# Patient Record
Sex: FEMALE | HISPANIC OR LATINO | URBAN - METROPOLITAN AREA
[De-identification: names, ages, dates, MRNs, and addresses within clinical notes are randomized per-mention and may not be internally consistent; named-entity substitution may affect disease eponyms.]

---

## 2017-01-11 ENCOUNTER — DOCTOR'S OFFICE (OUTPATIENT)
Dept: URBAN - METROPOLITAN AREA CLINIC 125 | Facility: CLINIC | Age: 72
Setting detail: OPHTHALMOLOGY
End: 2017-01-11
Payer: COMMERCIAL

## 2017-01-11 DIAGNOSIS — H25.043: ICD-10-CM

## 2017-01-11 PROCEDURE — 76519 ECHO EXAM OF EYE: CPT | Performed by: OPHTHALMOLOGY

## 2017-01-23 ENCOUNTER — AMBUL SURGICAL CARE (OUTPATIENT)
Dept: URBAN - METROPOLITAN AREA SURGERY 29 | Facility: SURGERY | Age: 72
Setting detail: OPHTHALMOLOGY
End: 2017-01-23
Payer: COMMERCIAL

## 2017-01-23 DIAGNOSIS — H25.011: ICD-10-CM

## 2017-01-23 DIAGNOSIS — H25.11: ICD-10-CM

## 2017-01-23 DIAGNOSIS — H25.041: ICD-10-CM

## 2017-01-23 PROCEDURE — 66984 XCAPSL CTRC RMVL W/O ECP: CPT | Performed by: OPHTHALMOLOGY

## 2017-01-23 PROCEDURE — G8918 PT W/O PREOP ORDER IV AB PRO: HCPCS | Performed by: OPHTHALMOLOGY

## 2017-01-23 PROCEDURE — G8907 PT DOC NO EVENTS ON DISCHARG: HCPCS | Performed by: OPHTHALMOLOGY

## 2017-01-24 ENCOUNTER — DOCTOR'S OFFICE (OUTPATIENT)
Dept: URBAN - METROPOLITAN AREA CLINIC 125 | Facility: CLINIC | Age: 72
Setting detail: OPHTHALMOLOGY
End: 2017-01-24
Payer: COMMERCIAL

## 2017-01-24 ENCOUNTER — RX ONLY (RX ONLY)
Age: 72
End: 2017-01-24

## 2017-01-24 DIAGNOSIS — Z96.1: ICD-10-CM

## 2017-01-24 PROCEDURE — 99024 POSTOP FOLLOW-UP VISIT: CPT | Performed by: OPTOMETRIST

## 2017-01-24 ASSESSMENT — REFRACTION_AUTOREFRACTION
OS_AXIS: 089
OD_SPHERE: +0.50
OS_CYLINDER: -2.00
OS_SPHERE: +1.00
OD_AXIS: 080
OD_CYLINDER: -1.00

## 2017-01-24 ASSESSMENT — SPHEQUIV_DERIVED
OS_SPHEQUIV: 0
OD_SPHEQUIV: 0

## 2017-01-24 ASSESSMENT — REFRACTION_MANIFEST
OD_VA2: 20/
OS_VA2: 20/
OS_VA2: 20/
OD_VA1: 20/
OS_VA1: 20/
OU_VA: 20/
OD_VA3: 20/
OD_VA1: 20/
OS_VA3: 20/
OS_VA1: 20/
OD_VA3: 20/
OU_VA: 20/
OU_VA: 20/
OD_VA2: 20/
OS_VA3: 20/
OS_VA3: 20/
OS_VA2: 20/
OD_VA1: 20/
OD_VA3: 20/
OD_VA2: 20/
OS_VA1: 20/

## 2017-01-24 ASSESSMENT — AXIALLENGTH_DERIVED
OD_AL: 23.6379
OS_AL: 23.5461

## 2017-01-24 ASSESSMENT — KERATOMETRY
OS_AXISANGLE_DEGREES: 095
OD_AXISANGLE_DEGREES: 067
OD_K2POWER_DIOPTERS: 43.75
OD_K1POWER_DIOPTERS: 43.00
OS_K1POWER_DIOPTERS: 42.75
OS_K2POWER_DIOPTERS: 44.50

## 2017-01-24 ASSESSMENT — CORNEAL DYSTROPHY
OD_DYSTROPHY: ARCUS SENILIS
OS_DYSTROPHY: ARCUS SENILIS

## 2017-01-24 ASSESSMENT — REFRACTION_CURRENTRX
OD_OVR_VA: 20/
OS_OVR_VA: 20/
OD_OVR_VA: 20/
OS_OVR_VA: 20/
OS_OVR_VA: 20/
OD_OVR_VA: 20/

## 2017-01-24 ASSESSMENT — VISUAL ACUITY
OD_BCVA: 20/70
OS_BCVA: 20/40-2

## 2017-01-30 ENCOUNTER — DOCTOR'S OFFICE (OUTPATIENT)
Dept: URBAN - METROPOLITAN AREA CLINIC 125 | Facility: CLINIC | Age: 72
Setting detail: OPHTHALMOLOGY
End: 2017-01-30
Payer: COMMERCIAL

## 2017-01-30 DIAGNOSIS — H25.042: ICD-10-CM

## 2017-01-30 DIAGNOSIS — Z96.1: ICD-10-CM

## 2017-01-30 PROCEDURE — 99024 POSTOP FOLLOW-UP VISIT: CPT | Performed by: OPHTHALMOLOGY

## 2017-01-30 ASSESSMENT — REFRACTION_MANIFEST
OD_VA3: 20/
OS_VA1: 20/
OS_VA2: 20/
OU_VA: 20/
OD_VA2: 20/
OD_VA3: 20/
OD_VA1: 20/
OS_VA1: 20/
OD_VA2: 20/
OD_VA2: 20/
OD_VA1: 20/
OU_VA: 20/
OS_VA2: 20/
OS_VA1: 20/
OS_VA2: 20/
OS_VA3: 20/
OU_VA: 20/
OS_VA3: 20/
OD_VA3: 20/
OD_VA1: 20/
OS_VA3: 20/

## 2017-01-30 ASSESSMENT — KERATOMETRY
OD_K1POWER_DIOPTERS: 43.50
OS_AXISANGLE_DEGREES: 095
OS_K2POWER_DIOPTERS: 44.50
OD_K2POWER_DIOPTERS: 44.25
OS_K1POWER_DIOPTERS: 42.75
OD_AXISANGLE_DEGREES: 054

## 2017-01-30 ASSESSMENT — VISUAL ACUITY
OS_BCVA: 20/40
OD_BCVA: 20/70

## 2017-01-30 ASSESSMENT — REFRACTION_CURRENTRX
OD_OVR_VA: 20/
OD_OVR_VA: 20/
OS_OVR_VA: 20/
OS_OVR_VA: 20/
OD_OVR_VA: 20/
OS_OVR_VA: 20/

## 2017-01-30 ASSESSMENT — REFRACTION_AUTOREFRACTION
OS_CYLINDER: -2.00
OS_SPHERE: +1.00
OD_SPHERE: +0.25
OD_CYLINDER: -1.25
OS_AXIS: 089
OD_AXIS: 075

## 2017-01-30 ASSESSMENT — AXIALLENGTH_DERIVED
OS_AL: 23.5461
OD_AL: 23.6003

## 2017-01-30 ASSESSMENT — CORNEAL DYSTROPHY
OS_DYSTROPHY: ARCUS SENILIS
OD_DYSTROPHY: ARCUS SENILIS

## 2017-01-30 ASSESSMENT — SPHEQUIV_DERIVED
OD_SPHEQUIV: -0.375
OS_SPHEQUIV: 0

## 2017-08-14 ENCOUNTER — DOCTOR'S OFFICE (OUTPATIENT)
Dept: URBAN - METROPOLITAN AREA CLINIC 125 | Facility: CLINIC | Age: 72
Setting detail: OPHTHALMOLOGY
End: 2017-08-14
Payer: COMMERCIAL

## 2017-08-14 DIAGNOSIS — H25.042: ICD-10-CM

## 2017-08-14 DIAGNOSIS — H35.30: ICD-10-CM

## 2017-08-14 DIAGNOSIS — Z96.1: ICD-10-CM

## 2017-08-14 PROCEDURE — 92014 COMPRE OPH EXAM EST PT 1/>: CPT | Performed by: OPHTHALMOLOGY

## 2017-08-14 ASSESSMENT — CONFRONTATIONAL VISUAL FIELD TEST (CVF)
OS_FINDINGS: FULL
OD_FINDINGS: FULL

## 2017-08-14 ASSESSMENT — REFRACTION_MANIFEST
OS_VA2: 20/
OS_VA3: 20/
OS_VA2: 20/
OD_VA1: 20/
OD_VA1: 20/
OD_VA3: 20/
OS_VA1: 20/
OS_VA3: 20/
OS_VA1: 20/
OD_VA3: 20/
OS_VA1: 20/
OS_VA2: 20/
OD_VA1: 20/
OD_VA2: 20/
OU_VA: 20/
OU_VA: 20/
OD_VA3: 20/
OD_VA2: 20/
OS_VA3: 20/
OU_VA: 20/
OD_VA2: 20/

## 2017-08-14 ASSESSMENT — REFRACTION_AUTOREFRACTION
OS_AXIS: 089
OS_SPHERE: +1.00
OD_AXIS: 075
OD_CYLINDER: -1.25
OS_CYLINDER: -2.00
OD_SPHERE: +0.25

## 2017-08-14 ASSESSMENT — REFRACTION_CURRENTRX
OS_OVR_VA: 20/
OS_OVR_VA: 20/
OD_OVR_VA: 20/
OS_OVR_VA: 20/
OD_OVR_VA: 20/
OD_OVR_VA: 20/

## 2017-08-14 ASSESSMENT — SPHEQUIV_DERIVED
OD_SPHEQUIV: -0.375
OS_SPHEQUIV: 0

## 2017-08-14 ASSESSMENT — CORNEAL DYSTROPHY
OS_DYSTROPHY: ARCUS SENILIS
OD_DYSTROPHY: ARCUS SENILIS

## 2017-08-14 ASSESSMENT — CORNEAL PTERYGIUM: OS_PTERYGIUM: 1MM

## 2017-08-14 ASSESSMENT — VISUAL ACUITY
OD_BCVA: 20/150
OS_BCVA: 20/30

## 2018-11-12 ENCOUNTER — DOCTOR'S OFFICE (OUTPATIENT)
Dept: URBAN - METROPOLITAN AREA CLINIC 125 | Facility: CLINIC | Age: 73
Setting detail: OPHTHALMOLOGY
End: 2018-11-12
Payer: COMMERCIAL

## 2018-11-12 DIAGNOSIS — H35.3132: ICD-10-CM

## 2018-11-12 DIAGNOSIS — Z96.1: ICD-10-CM

## 2018-11-12 DIAGNOSIS — H25.042: ICD-10-CM

## 2018-11-12 PROCEDURE — 92014 COMPRE OPH EXAM EST PT 1/>: CPT | Performed by: OPHTHALMOLOGY

## 2018-11-12 PROCEDURE — 92134 CPTRZ OPH DX IMG PST SGM RTA: CPT | Performed by: OPHTHALMOLOGY

## 2018-11-12 ASSESSMENT — SPHEQUIV_DERIVED
OS_SPHEQUIV: -0.625
OD_SPHEQUIV: 0.375

## 2018-11-12 ASSESSMENT — REFRACTION_MANIFEST
OS_VA3: 20/
OS_VA2: 20/
OS_VA1: 20/
OU_VA: 20/
OU_VA: 20/
OS_VA1: 20/
OD_VA1: 20/
OD_VA1: 20/
OD_VA2: 20/
OS_VA3: 20/
OD_VA2: 20/
OD_VA3: 20/
OS_VA2: 20/
OD_VA3: 20/

## 2018-11-12 ASSESSMENT — REFRACTION_CURRENTRX
OD_OVR_VA: 20/
OS_OVR_VA: 20/
OD_OVR_VA: 20/
OS_OVR_VA: 20/
OS_OVR_VA: 20/
OD_OVR_VA: 20/

## 2018-11-12 ASSESSMENT — REFRACTION_AUTOREFRACTION
OS_CYLINDER: -2.25
OD_SPHERE: +1.25
OS_SPHERE: +0.50
OS_AXIS: 102
OD_AXIS: 087
OD_CYLINDER: -1.75

## 2018-11-12 ASSESSMENT — CORNEAL DYSTROPHY
OS_DYSTROPHY: ARCUS SENILIS
OD_DYSTROPHY: ARCUS SENILIS

## 2018-11-12 ASSESSMENT — AXIALLENGTH_DERIVED
OS_AL: 23.7912
OD_AL: 23.4467

## 2018-11-12 ASSESSMENT — VISUAL ACUITY
OS_BCVA: 20/30-1
OD_BCVA: 20/400

## 2018-11-12 ASSESSMENT — KERATOMETRY
OD_AXISANGLE_DEGREES: 075
OS_K2POWER_DIOPTERS: 44.75
OS_K1POWER_DIOPTERS: 42.50
OS_AXISANGLE_DEGREES: 096
OD_K1POWER_DIOPTERS: 42.75
OD_K2POWER_DIOPTERS: 44.25

## 2018-11-12 ASSESSMENT — CORNEAL PTERYGIUM: OS_PTERYGIUM: 1MM

## 2018-11-12 ASSESSMENT — CONFRONTATIONAL VISUAL FIELD TEST (CVF)
OS_FINDINGS: FULL
OD_FINDINGS: FULL

## 2019-01-04 ENCOUNTER — DOCTOR'S OFFICE (OUTPATIENT)
Dept: URBAN - METROPOLITAN AREA CLINIC 125 | Facility: CLINIC | Age: 74
Setting detail: OPHTHALMOLOGY
End: 2019-01-04
Payer: COMMERCIAL

## 2019-01-04 DIAGNOSIS — H25.042: ICD-10-CM

## 2019-01-04 PROCEDURE — 76519 ECHO EXAM OF EYE: CPT | Performed by: OPHTHALMOLOGY

## 2019-01-28 ENCOUNTER — AMBUL SURGICAL CARE (OUTPATIENT)
Dept: URBAN - METROPOLITAN AREA SURGERY 29 | Facility: SURGERY | Age: 74
Setting detail: OPHTHALMOLOGY
End: 2019-01-28
Payer: COMMERCIAL

## 2019-01-28 DIAGNOSIS — H25.042: ICD-10-CM

## 2019-01-28 DIAGNOSIS — H25.012: ICD-10-CM

## 2019-01-28 DIAGNOSIS — H25.12: ICD-10-CM

## 2019-01-28 PROCEDURE — G8907 PT DOC NO EVENTS ON DISCHARG: HCPCS | Performed by: OPHTHALMOLOGY

## 2019-01-28 PROCEDURE — 66984 XCAPSL CTRC RMVL W/O ECP: CPT | Performed by: OPHTHALMOLOGY

## 2019-01-28 PROCEDURE — G8918 PT W/O PREOP ORDER IV AB PRO: HCPCS | Performed by: OPHTHALMOLOGY

## 2019-01-29 ENCOUNTER — RX ONLY (RX ONLY)
Age: 74
End: 2019-01-29

## 2019-01-29 ENCOUNTER — DOCTOR'S OFFICE (OUTPATIENT)
Dept: URBAN - METROPOLITAN AREA CLINIC 125 | Facility: CLINIC | Age: 74
Setting detail: OPHTHALMOLOGY
End: 2019-01-29

## 2019-01-29 DIAGNOSIS — Z96.1: ICD-10-CM

## 2019-01-29 PROCEDURE — 99024 POSTOP FOLLOW-UP VISIT: CPT | Performed by: OPTOMETRIST

## 2019-01-29 ASSESSMENT — REFRACTION_MANIFEST
OD_VA3: 20/
OS_VA1: 20/
OD_VA3: 20/
OS_VA3: 20/
OU_VA: 20/
OD_VA2: 20/
OD_VA1: 20/
OU_VA: 20/
OS_VA1: 20/
OD_VA2: 20/
OS_VA3: 20/
OS_VA2: 20/
OD_VA1: 20/
OS_VA2: 20/

## 2019-01-29 ASSESSMENT — KERATOMETRY
OD_K1POWER_DIOPTERS: 42.75
OS_AXISANGLE_DEGREES: 084
OS_K1POWER_DIOPTERS: 43.00
OS_K2POWER_DIOPTERS: 44.00
OD_K2POWER_DIOPTERS: 44.25
OD_AXISANGLE_DEGREES: 075

## 2019-01-29 ASSESSMENT — CORNEAL DYSTROPHY
OD_DYSTROPHY: ARCUS SENILIS
OS_DYSTROPHY: ARCUS SENILIS

## 2019-01-29 ASSESSMENT — REFRACTION_CURRENTRX
OD_OVR_VA: 20/
OS_OVR_VA: 20/
OS_OVR_VA: 20/
OD_OVR_VA: 20/
OD_OVR_VA: 20/
OS_OVR_VA: 20/

## 2019-01-29 ASSESSMENT — CORNEAL PTERYGIUM: OS_PTERYGIUM: 1MM

## 2019-01-29 ASSESSMENT — REFRACTION_AUTOREFRACTION
OD_CYLINDER: -1.75
OS_AXIS: 082
OD_AXIS: 087
OD_SPHERE: +1.25
OS_CYLINDER: -1.50
OS_SPHERE: +1.00

## 2019-01-29 ASSESSMENT — VISUAL ACUITY
OS_BCVA: 20/30-1
OD_BCVA: 20/50-2

## 2019-01-29 ASSESSMENT — SUPERFICIAL PUNCTATE KERATITIS (SPK): OS_SPK: T 1+

## 2019-01-29 ASSESSMENT — AXIALLENGTH_DERIVED
OS_AL: 23.4949
OD_AL: 23.4467

## 2019-01-29 ASSESSMENT — SPHEQUIV_DERIVED
OS_SPHEQUIV: 0.25
OD_SPHEQUIV: 0.375

## 2019-02-07 ENCOUNTER — DOCTOR'S OFFICE (OUTPATIENT)
Dept: URBAN - METROPOLITAN AREA CLINIC 125 | Facility: CLINIC | Age: 74
Setting detail: OPHTHALMOLOGY
End: 2019-02-07

## 2019-02-07 DIAGNOSIS — Z96.1: ICD-10-CM

## 2019-02-07 PROCEDURE — 99024 POSTOP FOLLOW-UP VISIT: CPT | Performed by: OPTOMETRIST

## 2019-02-07 ASSESSMENT — REFRACTION_MANIFEST
OD_VA1: 20/
OD_VA3: 20/
OD_VA3: 20/
OD_VA2: 20/
OD_VA1: 20/
OU_VA: 20/
OD_VA2: 20/
OS_VA1: 20/
OS_VA3: 20/
OS_VA3: 20/
OU_VA: 20/
OS_VA2: 20/
OS_VA1: 20/
OS_VA2: 20/

## 2019-02-07 ASSESSMENT — KERATOMETRY
OD_K2POWER_DIOPTERS: 44.25
OD_AXISANGLE_DEGREES: 075
OS_AXISANGLE_DEGREES: 084
OD_K1POWER_DIOPTERS: 42.75
OS_K1POWER_DIOPTERS: 43.00
OS_K2POWER_DIOPTERS: 44.00

## 2019-02-07 ASSESSMENT — CORNEAL PTERYGIUM: OS_PTERYGIUM: 1MM

## 2019-02-07 ASSESSMENT — REFRACTION_AUTOREFRACTION
OS_AXIS: 093
OD_SPHERE: +1.25
OD_AXIS: 087
OD_CYLINDER: -1.75
OS_SPHERE: +1.50
OS_CYLINDER: -2.00

## 2019-02-07 ASSESSMENT — REFRACTION_CURRENTRX
OS_OVR_VA: 20/
OD_OVR_VA: 20/
OD_OVR_VA: 20/
OS_OVR_VA: 20/
OD_OVR_VA: 20/
OS_OVR_VA: 20/

## 2019-02-07 ASSESSMENT — SPHEQUIV_DERIVED
OS_SPHEQUIV: 0.5
OD_SPHEQUIV: 0.375

## 2019-02-07 ASSESSMENT — CORNEAL DYSTROPHY
OD_DYSTROPHY: ARCUS SENILIS
OS_DYSTROPHY: ARCUS SENILIS

## 2019-02-07 ASSESSMENT — VISUAL ACUITY
OS_BCVA: 20/30-1
OD_BCVA: 20/40-2

## 2019-02-07 ASSESSMENT — AXIALLENGTH_DERIVED
OD_AL: 23.4467
OS_AL: 23.3987

## 2020-10-14 ENCOUNTER — DOCTOR'S OFFICE (OUTPATIENT)
Dept: URBAN - METROPOLITAN AREA CLINIC 125 | Facility: CLINIC | Age: 75
Setting detail: OPHTHALMOLOGY
End: 2020-10-14
Payer: COMMERCIAL

## 2020-10-14 DIAGNOSIS — H10.13: ICD-10-CM

## 2020-10-14 DIAGNOSIS — H35.3132: ICD-10-CM

## 2020-10-14 DIAGNOSIS — H53.123: ICD-10-CM

## 2020-10-14 DIAGNOSIS — H26.40: ICD-10-CM

## 2020-10-14 PROCEDURE — 99214 OFFICE O/P EST MOD 30 MIN: CPT | Performed by: OPHTHALMOLOGY

## 2020-10-14 PROCEDURE — 92134 CPTRZ OPH DX IMG PST SGM RTA: CPT | Performed by: OPHTHALMOLOGY

## 2020-10-14 ASSESSMENT — SPHEQUIV_DERIVED
OS_SPHEQUIV: 0.5
OD_SPHEQUIV: 0.375

## 2020-10-14 ASSESSMENT — REFRACTION_AUTOREFRACTION
OS_CYLINDER: -2.00
OD_AXIS: 087
OS_SPHERE: +1.50
OD_SPHERE: +1.25
OS_AXIS: 093
OD_CYLINDER: -1.75

## 2020-10-14 ASSESSMENT — KERATOMETRY
OD_K1POWER_DIOPTERS: 42.75
OD_AXISANGLE_DEGREES: 075
OS_K2POWER_DIOPTERS: 44.00
OD_K2POWER_DIOPTERS: 44.25
OS_K1POWER_DIOPTERS: 43.00
OS_AXISANGLE_DEGREES: 084

## 2020-10-14 ASSESSMENT — CORNEAL DYSTROPHY
OD_DYSTROPHY: ARCUS SENILIS
OS_DYSTROPHY: ARCUS SENILIS

## 2020-10-14 ASSESSMENT — VISUAL ACUITY
OD_BCVA: 20/60+2
OS_BCVA: 20/40

## 2020-10-14 ASSESSMENT — CORNEAL PTERYGIUM: OS_PTERYGIUM: 1MM

## 2020-10-14 ASSESSMENT — CONFRONTATIONAL VISUAL FIELD TEST (CVF)
OS_FINDINGS: FULL
OD_FINDINGS: FULL

## 2020-10-14 ASSESSMENT — AXIALLENGTH_DERIVED
OD_AL: 23.4467
OS_AL: 23.3987

## 2020-10-21 ENCOUNTER — DOCTOR'S OFFICE (OUTPATIENT)
Dept: URBAN - METROPOLITAN AREA CLINIC 125 | Facility: CLINIC | Age: 75
Setting detail: OPHTHALMOLOGY
End: 2020-10-21
Payer: COMMERCIAL

## 2020-10-21 ENCOUNTER — RX ONLY (RX ONLY)
Age: 75
End: 2020-10-21

## 2020-10-21 VITALS — HEIGHT: 55 IN

## 2020-10-21 DIAGNOSIS — H10.13: ICD-10-CM

## 2020-10-21 PROCEDURE — 99212 OFFICE O/P EST SF 10 MIN: CPT | Performed by: OPHTHALMOLOGY

## 2020-10-21 ASSESSMENT — VISUAL ACUITY
OD_BCVA: 20/60+2
OS_BCVA: 20/30-2

## 2020-10-21 ASSESSMENT — AXIALLENGTH_DERIVED
OD_AL: 23.4467
OS_AL: 23.3987

## 2020-10-21 ASSESSMENT — REFRACTION_AUTOREFRACTION
OS_CYLINDER: -2.00
OD_SPHERE: +1.25
OD_AXIS: 087
OD_CYLINDER: -1.75
OS_AXIS: 093
OS_SPHERE: +1.50

## 2020-10-21 ASSESSMENT — KERATOMETRY
OS_K1POWER_DIOPTERS: 43.00
OS_K2POWER_DIOPTERS: 44.00
OD_K2POWER_DIOPTERS: 44.25
OD_K1POWER_DIOPTERS: 42.75
OS_AXISANGLE_DEGREES: 084
OD_AXISANGLE_DEGREES: 075

## 2020-10-21 ASSESSMENT — CORNEAL DYSTROPHY
OS_DYSTROPHY: ARCUS SENILIS
OD_DYSTROPHY: ARCUS SENILIS

## 2020-10-21 ASSESSMENT — SPHEQUIV_DERIVED
OS_SPHEQUIV: 0.5
OD_SPHEQUIV: 0.375

## 2020-10-21 ASSESSMENT — CONFRONTATIONAL VISUAL FIELD TEST (CVF)
OS_FINDINGS: FULL
OD_FINDINGS: FULL

## 2020-10-21 ASSESSMENT — CORNEAL PTERYGIUM: OS_PTERYGIUM: 1MM

## 2020-11-04 ENCOUNTER — DOCTOR'S OFFICE (OUTPATIENT)
Dept: URBAN - METROPOLITAN AREA CLINIC 125 | Facility: CLINIC | Age: 75
Setting detail: OPHTHALMOLOGY
End: 2020-11-04
Payer: COMMERCIAL

## 2020-11-04 DIAGNOSIS — H10.13: ICD-10-CM

## 2020-11-04 PROCEDURE — 92012 INTRM OPH EXAM EST PATIENT: CPT | Performed by: OPHTHALMOLOGY

## 2020-11-04 ASSESSMENT — VISUAL ACUITY
OD_BCVA: 20/60+2
OS_BCVA: 20/30+2

## 2020-11-04 ASSESSMENT — KERATOMETRY
OS_K1POWER_DIOPTERS: 43.00
OS_AXISANGLE_DEGREES: 084
OD_AXISANGLE_DEGREES: 075
OD_K2POWER_DIOPTERS: 44.25
OS_K2POWER_DIOPTERS: 44.00
OD_K1POWER_DIOPTERS: 42.75

## 2020-11-04 ASSESSMENT — AXIALLENGTH_DERIVED
OD_AL: 23.4467
OS_AL: 23.3987

## 2020-11-04 ASSESSMENT — SPHEQUIV_DERIVED
OS_SPHEQUIV: 0.5
OD_SPHEQUIV: 0.375

## 2020-11-04 ASSESSMENT — REFRACTION_AUTOREFRACTION
OD_AXIS: 087
OS_CYLINDER: -2.00
OD_SPHERE: +1.25
OD_CYLINDER: -1.75
OS_SPHERE: +1.50
OS_AXIS: 093

## 2020-11-04 ASSESSMENT — CORNEAL PTERYGIUM: OS_PTERYGIUM: 1MM

## 2020-11-04 ASSESSMENT — CORNEAL DYSTROPHY
OS_DYSTROPHY: ARCUS SENILIS
OD_DYSTROPHY: ARCUS SENILIS

## 2021-01-08 ENCOUNTER — DOCTOR'S OFFICE (OUTPATIENT)
Dept: URBAN - METROPOLITAN AREA CLINIC 125 | Facility: CLINIC | Age: 76
Setting detail: OPHTHALMOLOGY
End: 2021-01-08
Payer: COMMERCIAL

## 2021-01-08 DIAGNOSIS — H35.3132: ICD-10-CM

## 2021-01-08 DIAGNOSIS — H10.13: ICD-10-CM

## 2021-01-08 DIAGNOSIS — H53.123: ICD-10-CM

## 2021-01-08 PROCEDURE — 92014 COMPRE OPH EXAM EST PT 1/>: CPT | Performed by: OPHTHALMOLOGY

## 2021-01-08 PROCEDURE — 92202 OPSCPY EXTND ON/MAC DRAW: CPT | Performed by: OPHTHALMOLOGY

## 2021-01-08 PROCEDURE — 92134 CPTRZ OPH DX IMG PST SGM RTA: CPT | Performed by: OPHTHALMOLOGY

## 2021-01-08 ASSESSMENT — REFRACTION_AUTOREFRACTION
OD_CYLINDER: -1.75
OD_SPHERE: +1.25
OS_SPHERE: +1.50
OS_AXIS: 093
OD_AXIS: 087
OS_CYLINDER: -2.00

## 2021-01-08 ASSESSMENT — KERATOMETRY
OS_K1POWER_DIOPTERS: 43.00
OS_AXISANGLE_DEGREES: 084
OD_K1POWER_DIOPTERS: 42.75
OS_K2POWER_DIOPTERS: 44.00
OD_K2POWER_DIOPTERS: 44.25
OD_AXISANGLE_DEGREES: 075

## 2021-01-08 ASSESSMENT — SPHEQUIV_DERIVED
OD_SPHEQUIV: 0.375
OS_SPHEQUIV: 0.5

## 2021-01-08 ASSESSMENT — VISUAL ACUITY
OS_BCVA: 20/20-2
OD_BCVA: 20/20-1

## 2021-01-08 ASSESSMENT — AXIALLENGTH_DERIVED
OD_AL: 23.4467
OS_AL: 23.3987

## 2021-01-08 ASSESSMENT — CORNEAL PTERYGIUM: OS_PTERYGIUM: 1MM

## 2021-01-08 ASSESSMENT — CORNEAL DYSTROPHY
OS_DYSTROPHY: ARCUS SENILIS
OD_DYSTROPHY: ARCUS SENILIS

## 2021-01-08 ASSESSMENT — CONFRONTATIONAL VISUAL FIELD TEST (CVF)
OS_FINDINGS: FULL
OD_FINDINGS: FULL

## 2021-01-22 ENCOUNTER — DOCTOR'S OFFICE (OUTPATIENT)
Dept: URBAN - METROPOLITAN AREA CLINIC 125 | Facility: CLINIC | Age: 76
Setting detail: OPHTHALMOLOGY
End: 2021-01-22
Payer: COMMERCIAL

## 2021-01-22 VITALS — HEIGHT: 55 IN

## 2021-01-22 DIAGNOSIS — H35.3132: ICD-10-CM

## 2021-01-22 DIAGNOSIS — H53.123: ICD-10-CM

## 2021-01-22 DIAGNOSIS — H10.13: ICD-10-CM

## 2021-01-22 PROCEDURE — 99214 OFFICE O/P EST MOD 30 MIN: CPT | Performed by: OPHTHALMOLOGY

## 2021-01-22 PROCEDURE — 92250 FUNDUS PHOTOGRAPHY W/I&R: CPT | Performed by: OPHTHALMOLOGY

## 2021-01-22 ASSESSMENT — CORNEAL DYSTROPHY
OS_DYSTROPHY: ARCUS SENILIS
OD_DYSTROPHY: ARCUS SENILIS

## 2021-01-22 ASSESSMENT — KERATOMETRY
OS_K1POWER_DIOPTERS: 43.00
OD_K2POWER_DIOPTERS: 44.25
OD_AXISANGLE_DEGREES: 075
OD_K1POWER_DIOPTERS: 42.75
OS_K2POWER_DIOPTERS: 44.00
OS_AXISANGLE_DEGREES: 084

## 2021-01-22 ASSESSMENT — REFRACTION_AUTOREFRACTION
OS_CYLINDER: -2.00
OS_SPHERE: +1.50
OD_CYLINDER: -1.75
OD_AXIS: 087
OD_SPHERE: +1.25
OS_AXIS: 093

## 2021-01-22 ASSESSMENT — AXIALLENGTH_DERIVED
OD_AL: 23.4467
OS_AL: 23.3987

## 2021-01-22 ASSESSMENT — SPHEQUIV_DERIVED
OS_SPHEQUIV: 0.5
OD_SPHEQUIV: 0.375

## 2021-01-22 ASSESSMENT — CONFRONTATIONAL VISUAL FIELD TEST (CVF)
OS_FINDINGS: FULL
OD_FINDINGS: FULL

## 2021-01-22 ASSESSMENT — VISUAL ACUITY
OS_BCVA: 20/25
OD_BCVA: 20/30-1

## 2021-01-22 ASSESSMENT — CORNEAL PTERYGIUM: OS_PTERYGIUM: 1MM

## 2021-06-09 ENCOUNTER — DOCTOR'S OFFICE (OUTPATIENT)
Dept: URBAN - METROPOLITAN AREA CLINIC 125 | Facility: CLINIC | Age: 76
Setting detail: OPHTHALMOLOGY
End: 2021-06-09
Payer: COMMERCIAL

## 2021-06-09 DIAGNOSIS — H10.13: ICD-10-CM

## 2021-06-09 DIAGNOSIS — H35.3132: ICD-10-CM

## 2021-06-09 DIAGNOSIS — H53.122: ICD-10-CM

## 2021-06-09 DIAGNOSIS — H53.123: ICD-10-CM

## 2021-06-09 PROCEDURE — 92134 CPTRZ OPH DX IMG PST SGM RTA: CPT | Performed by: OPHTHALMOLOGY

## 2021-06-09 PROCEDURE — 92014 COMPRE OPH EXAM EST PT 1/>: CPT | Performed by: OPHTHALMOLOGY

## 2021-06-09 PROCEDURE — 92202 OPSCPY EXTND ON/MAC DRAW: CPT | Performed by: OPHTHALMOLOGY

## 2021-06-09 ASSESSMENT — AXIALLENGTH_DERIVED
OD_AL: 23.4467
OS_AL: 23.3987

## 2021-06-09 ASSESSMENT — KERATOMETRY
OS_K2POWER_DIOPTERS: 44.00
OD_K2POWER_DIOPTERS: 44.25
OS_AXISANGLE_DEGREES: 084
OD_K1POWER_DIOPTERS: 42.75
OD_AXISANGLE_DEGREES: 075
OS_K1POWER_DIOPTERS: 43.00

## 2021-06-09 ASSESSMENT — CORNEAL DYSTROPHY
OS_DYSTROPHY: ARCUS SENILIS
OD_DYSTROPHY: ARCUS SENILIS

## 2021-06-09 ASSESSMENT — SPHEQUIV_DERIVED
OD_SPHEQUIV: 0.375
OS_SPHEQUIV: 0.5

## 2021-06-09 ASSESSMENT — CONFRONTATIONAL VISUAL FIELD TEST (CVF)
OD_FINDINGS: FULL
OS_FINDINGS: FULL

## 2021-06-09 ASSESSMENT — REFRACTION_AUTOREFRACTION
OD_AXIS: 087
OS_SPHERE: +1.50
OS_CYLINDER: -2.00
OD_SPHERE: +1.25
OD_CYLINDER: -1.75
OS_AXIS: 093

## 2021-06-09 ASSESSMENT — VISUAL ACUITY
OD_BCVA: 20/400
OS_BCVA: 20/30-1

## 2021-06-09 ASSESSMENT — CORNEAL PTERYGIUM: OS_PTERYGIUM: 1MM

## 2021-06-11 ENCOUNTER — DOCTOR'S OFFICE (OUTPATIENT)
Dept: URBAN - METROPOLITAN AREA CLINIC 125 | Facility: CLINIC | Age: 76
Setting detail: OPHTHALMOLOGY
End: 2021-06-11
Payer: COMMERCIAL

## 2021-06-11 DIAGNOSIS — H10.13: ICD-10-CM

## 2021-06-11 DIAGNOSIS — H26.40: ICD-10-CM

## 2021-06-11 DIAGNOSIS — H35.3132: ICD-10-CM

## 2021-06-11 DIAGNOSIS — H53.123: ICD-10-CM

## 2021-06-11 PROCEDURE — 92014 COMPRE OPH EXAM EST PT 1/>: CPT | Performed by: OPHTHALMOLOGY

## 2021-06-11 PROCEDURE — 92250 FUNDUS PHOTOGRAPHY W/I&R: CPT | Performed by: OPHTHALMOLOGY

## 2021-06-11 PROCEDURE — 92235 FLUORESCEIN ANGRPH MLTIFRAME: CPT | Performed by: OPHTHALMOLOGY

## 2021-06-11 ASSESSMENT — REFRACTION_AUTOREFRACTION
OD_AXIS: 087
OS_CYLINDER: -2.00
OS_SPHERE: +1.50
OD_CYLINDER: -1.75
OD_SPHERE: +1.25
OS_AXIS: 093

## 2021-06-11 ASSESSMENT — KERATOMETRY
OS_AXISANGLE_DEGREES: 084
OS_K2POWER_DIOPTERS: 44.00
OS_K1POWER_DIOPTERS: 43.00
OD_K2POWER_DIOPTERS: 44.25
OD_AXISANGLE_DEGREES: 075
OD_K1POWER_DIOPTERS: 42.75

## 2021-06-11 ASSESSMENT — AXIALLENGTH_DERIVED
OD_AL: 23.4467
OS_AL: 23.3987

## 2021-06-11 ASSESSMENT — SPHEQUIV_DERIVED
OS_SPHEQUIV: 0.5
OD_SPHEQUIV: 0.375

## 2021-06-11 ASSESSMENT — CONFRONTATIONAL VISUAL FIELD TEST (CVF)
OS_FINDINGS: FULL
OD_FINDINGS: FULL

## 2021-06-11 ASSESSMENT — VISUAL ACUITY
OS_BCVA: 20/30
OD_BCVA: 20/40-2

## 2021-06-11 ASSESSMENT — CORNEAL DYSTROPHY
OD_DYSTROPHY: ARCUS SENILIS
OS_DYSTROPHY: ARCUS SENILIS

## 2021-06-11 ASSESSMENT — CORNEAL PTERYGIUM: OS_PTERYGIUM: 1MM

## 2021-06-30 ENCOUNTER — DOCTOR'S OFFICE (OUTPATIENT)
Dept: URBAN - METROPOLITAN AREA CLINIC 125 | Facility: CLINIC | Age: 76
Setting detail: OPHTHALMOLOGY
End: 2021-06-30
Payer: COMMERCIAL

## 2021-06-30 VITALS — HEIGHT: 55 IN

## 2021-06-30 DIAGNOSIS — H53.123: ICD-10-CM

## 2021-06-30 DIAGNOSIS — H26.40: ICD-10-CM

## 2021-06-30 DIAGNOSIS — H35.3132: ICD-10-CM

## 2021-06-30 DIAGNOSIS — H10.13: ICD-10-CM

## 2021-06-30 PROCEDURE — 92014 COMPRE OPH EXAM EST PT 1/>: CPT | Performed by: OPHTHALMOLOGY

## 2021-06-30 PROCEDURE — 92134 CPTRZ OPH DX IMG PST SGM RTA: CPT | Performed by: OPHTHALMOLOGY

## 2021-06-30 PROCEDURE — 92202 OPSCPY EXTND ON/MAC DRAW: CPT | Performed by: OPHTHALMOLOGY

## 2021-06-30 ASSESSMENT — CONFRONTATIONAL VISUAL FIELD TEST (CVF)
OD_FINDINGS: FULL
OS_FINDINGS: FULL

## 2021-06-30 ASSESSMENT — KERATOMETRY
OS_AXISANGLE_DEGREES: 084
OS_K1POWER_DIOPTERS: 43.00
OD_AXISANGLE_DEGREES: 075
OD_K1POWER_DIOPTERS: 42.75
OS_K2POWER_DIOPTERS: 44.00
OD_K2POWER_DIOPTERS: 44.25

## 2021-06-30 ASSESSMENT — VISUAL ACUITY
OD_BCVA: 20/50-1
OS_BCVA: 20/30-2

## 2021-06-30 ASSESSMENT — REFRACTION_AUTOREFRACTION
OD_SPHERE: +1.25
OS_CYLINDER: -2.00
OS_AXIS: 093
OS_SPHERE: +1.50
OD_CYLINDER: -1.75
OD_AXIS: 087

## 2021-06-30 ASSESSMENT — CORNEAL DYSTROPHY
OS_DYSTROPHY: ARCUS SENILIS
OD_DYSTROPHY: ARCUS SENILIS

## 2021-06-30 ASSESSMENT — SPHEQUIV_DERIVED
OS_SPHEQUIV: 0.5
OD_SPHEQUIV: 0.375

## 2021-06-30 ASSESSMENT — CORNEAL PTERYGIUM: OS_PTERYGIUM: 1MM

## 2021-06-30 ASSESSMENT — AXIALLENGTH_DERIVED
OS_AL: 23.3987
OD_AL: 23.4467

## 2021-07-07 ENCOUNTER — DOCTOR'S OFFICE (OUTPATIENT)
Dept: URBAN - METROPOLITAN AREA CLINIC 125 | Facility: CLINIC | Age: 76
Setting detail: OPHTHALMOLOGY
End: 2021-07-07
Payer: COMMERCIAL

## 2021-07-07 DIAGNOSIS — H52.223: ICD-10-CM

## 2021-07-07 DIAGNOSIS — H52.4: ICD-10-CM

## 2021-07-07 PROCEDURE — 92012 INTRM OPH EXAM EST PATIENT: CPT | Performed by: OPTOMETRIST

## 2021-07-07 PROCEDURE — 92015 DETERMINE REFRACTIVE STATE: CPT | Performed by: OPTOMETRIST

## 2021-07-07 ASSESSMENT — AXIALLENGTH_DERIVED
OD_AL: 23.4467
OD_AL: 23.5919
OS_AL: 23.5004
OS_AL: 23.4042

## 2021-07-07 ASSESSMENT — REFRACTION_AUTOREFRACTION
OD_AXIS: 083
OD_CYLINDER: -2.25
OS_AXIS: 101
OD_SPHERE: +1.50
OS_SPHERE: +1.75
OS_CYLINDER: -3.00

## 2021-07-07 ASSESSMENT — KERATOMETRY
OD_AXISANGLE_DEGREES: 075
OD_K2POWER_DIOPTERS: 44.25
OD_K1POWER_DIOPTERS: 42.75
OS_K1POWER_DIOPTERS: 42.75
OS_AXISANGLE_DEGREES: 102
OS_K2POWER_DIOPTERS: 44.75

## 2021-07-07 ASSESSMENT — REFRACTION_MANIFEST
OS_ADD: +2.25
OD_AXIS: 080
OD_SPHERE: +0.75
OD_ADD: +2.25
OS_SPHERE: +1.00
OS_AXIS: 100
OS_CYLINDER: -2.00
OS_VA1: 20/25-
OD_CYLINDER: -1.50
OD_VA1: 20/25-

## 2021-07-07 ASSESSMENT — SPHEQUIV_DERIVED
OD_SPHEQUIV: 0
OS_SPHEQUIV: 0
OS_SPHEQUIV: 0.25
OD_SPHEQUIV: 0.375

## 2021-07-07 ASSESSMENT — VISUAL ACUITY
OS_BCVA: 20/30-2
OD_BCVA: 20/50-2

## 2021-08-13 ENCOUNTER — DOCTOR'S OFFICE (OUTPATIENT)
Dept: URBAN - METROPOLITAN AREA CLINIC 125 | Facility: CLINIC | Age: 76
Setting detail: OPHTHALMOLOGY
End: 2021-08-13
Payer: COMMERCIAL

## 2021-08-13 DIAGNOSIS — H35.3132: ICD-10-CM

## 2021-08-13 DIAGNOSIS — H16.223: ICD-10-CM

## 2021-08-13 PROCEDURE — 92014 COMPRE OPH EXAM EST PT 1/>: CPT | Performed by: OPHTHALMOLOGY

## 2021-08-13 PROCEDURE — 92134 CPTRZ OPH DX IMG PST SGM RTA: CPT | Performed by: OPHTHALMOLOGY

## 2021-08-13 ASSESSMENT — REFRACTION_AUTOREFRACTION
OD_SPHERE: +1.50
OS_SPHERE: +1.75
OS_CYLINDER: -3.00
OD_CYLINDER: -2.25
OD_AXIS: 083
OS_AXIS: 101

## 2021-08-13 ASSESSMENT — KERATOMETRY
OS_K2POWER_DIOPTERS: 44.75
OS_AXISANGLE_DEGREES: 102
OD_AXISANGLE_DEGREES: 075
OS_K1POWER_DIOPTERS: 42.75
OD_K1POWER_DIOPTERS: 42.75
OD_K2POWER_DIOPTERS: 44.25

## 2021-08-13 ASSESSMENT — CORNEAL DYSTROPHY
OS_DYSTROPHY: ARCUS SENILIS
OD_DYSTROPHY: ARCUS SENILIS

## 2021-08-13 ASSESSMENT — AXIALLENGTH_DERIVED
OS_AL: 23.4042
OS_AL: 23.5004
OD_AL: 23.4467
OD_AL: 23.5919

## 2021-08-13 ASSESSMENT — REFRACTION_MANIFEST
OS_VA1: 20/25-
OD_AXIS: 080
OD_CYLINDER: -1.50
OS_CYLINDER: -2.00
OD_SPHERE: +0.75
OD_ADD: +2.25
OD_VA1: 20/25-
OS_ADD: +2.25
OS_AXIS: 100
OS_SPHERE: +1.00

## 2021-08-13 ASSESSMENT — VISUAL ACUITY
OS_BCVA: 20/25+2
OD_BCVA: 20/40

## 2021-08-13 ASSESSMENT — SPHEQUIV_DERIVED
OD_SPHEQUIV: 0.375
OS_SPHEQUIV: 0
OD_SPHEQUIV: 0
OS_SPHEQUIV: 0.25

## 2021-08-13 ASSESSMENT — CORNEAL PTERYGIUM: OS_PTERYGIUM: 1MM

## 2021-08-25 ENCOUNTER — DOCTOR'S OFFICE (OUTPATIENT)
Dept: URBAN - METROPOLITAN AREA CLINIC 125 | Facility: CLINIC | Age: 76
Setting detail: OPHTHALMOLOGY
End: 2021-08-25
Payer: COMMERCIAL

## 2021-08-25 DIAGNOSIS — H16.223: ICD-10-CM

## 2021-08-25 PROCEDURE — 68761 CLOSE TEAR DUCT OPENING: CPT | Performed by: OPHTHALMOLOGY

## 2021-08-25 ASSESSMENT — REFRACTION_MANIFEST
OD_VA1: 20/25-
OD_AXIS: 080
OD_CYLINDER: -1.50
OS_SPHERE: +1.00
OS_CYLINDER: -2.00
OS_VA1: 20/25-
OD_ADD: +2.25
OS_AXIS: 100
OD_SPHERE: +0.75
OS_ADD: +2.25

## 2021-08-25 ASSESSMENT — SPHEQUIV_DERIVED
OD_SPHEQUIV: 0
OS_SPHEQUIV: 0.25
OD_SPHEQUIV: 0.375
OS_SPHEQUIV: 0

## 2021-08-25 ASSESSMENT — VISUAL ACUITY
OD_BCVA: 20/40
OS_BCVA: 20/25+2

## 2021-08-25 ASSESSMENT — REFRACTION_AUTOREFRACTION
OD_SPHERE: +1.50
OD_AXIS: 083
OS_CYLINDER: -3.00
OS_SPHERE: +1.75
OS_AXIS: 101
OD_CYLINDER: -2.25

## 2021-08-25 ASSESSMENT — KERATOMETRY
OS_K2POWER_DIOPTERS: 44.75
OD_AXISANGLE_DEGREES: 075
OD_K1POWER_DIOPTERS: 42.75
OD_K2POWER_DIOPTERS: 44.25
OS_AXISANGLE_DEGREES: 102
OS_K1POWER_DIOPTERS: 42.75

## 2021-08-25 ASSESSMENT — AXIALLENGTH_DERIVED
OD_AL: 23.5919
OS_AL: 23.4042
OS_AL: 23.5004
OD_AL: 23.4467

## 2021-09-22 ENCOUNTER — DOCTOR'S OFFICE (OUTPATIENT)
Dept: URBAN - METROPOLITAN AREA CLINIC 125 | Facility: CLINIC | Age: 76
Setting detail: OPHTHALMOLOGY
End: 2021-09-22
Payer: COMMERCIAL

## 2021-09-22 DIAGNOSIS — H35.3132: ICD-10-CM

## 2021-09-22 DIAGNOSIS — H16.223: ICD-10-CM

## 2021-09-22 PROCEDURE — 92014 COMPRE OPH EXAM EST PT 1/>: CPT | Performed by: OPHTHALMOLOGY

## 2021-09-22 PROCEDURE — 92202 OPSCPY EXTND ON/MAC DRAW: CPT | Performed by: OPHTHALMOLOGY

## 2021-09-22 PROCEDURE — 92134 CPTRZ OPH DX IMG PST SGM RTA: CPT | Performed by: OPHTHALMOLOGY

## 2021-09-22 ASSESSMENT — REFRACTION_MANIFEST
OS_ADD: +2.25
OD_CYLINDER: -1.50
OS_SPHERE: +1.00
OD_AXIS: 080
OD_VA1: 20/25-
OS_AXIS: 100
OS_CYLINDER: -2.00
OD_SPHERE: +0.75
OD_ADD: +2.25
OS_VA1: 20/25-

## 2021-09-22 ASSESSMENT — CONFRONTATIONAL VISUAL FIELD TEST (CVF)
OS_FINDINGS: FULL
OD_FINDINGS: FULL

## 2021-09-22 ASSESSMENT — VISUAL ACUITY
OD_BCVA: 20/50-2
OS_BCVA: 20/25

## 2021-09-22 ASSESSMENT — REFRACTION_AUTOREFRACTION
OD_CYLINDER: -2.25
OS_SPHERE: +1.75
OS_CYLINDER: -3.00
OS_AXIS: 101
OD_SPHERE: +1.50
OD_AXIS: 083

## 2021-09-22 ASSESSMENT — AXIALLENGTH_DERIVED
OS_AL: 23.4042
OD_AL: 23.4467
OS_AL: 23.5004
OD_AL: 23.5919

## 2021-09-22 ASSESSMENT — KERATOMETRY
OD_K1POWER_DIOPTERS: 42.75
OS_K1POWER_DIOPTERS: 42.75
OD_K2POWER_DIOPTERS: 44.25
OS_AXISANGLE_DEGREES: 102
OS_K2POWER_DIOPTERS: 44.75
OD_AXISANGLE_DEGREES: 075

## 2021-09-22 ASSESSMENT — SPHEQUIV_DERIVED
OD_SPHEQUIV: 0.375
OD_SPHEQUIV: 0
OS_SPHEQUIV: 0.25
OS_SPHEQUIV: 0

## 2021-09-22 ASSESSMENT — CORNEAL DYSTROPHY
OS_DYSTROPHY: ARCUS SENILIS
OD_DYSTROPHY: ARCUS SENILIS

## 2021-09-22 ASSESSMENT — CORNEAL PTERYGIUM: OS_PTERYGIUM: 1MM

## 2022-05-12 ENCOUNTER — OPTICAL OFFICE (OUTPATIENT)
Dept: URBAN - NONMETROPOLITAN AREA CLINIC 4 | Facility: CLINIC | Age: 77
Setting detail: OPHTHALMOLOGY
End: 2022-05-12
Payer: COMMERCIAL

## 2022-05-12 DIAGNOSIS — Z96.1: ICD-10-CM

## 2022-05-12 PROCEDURE — V2020 VISION SVCS FRAMES PURCHASES: HCPCS | Performed by: OPHTHALMOLOGY

## 2022-05-12 PROCEDURE — V2203 LENS SPHCYL BIFOCAL 4.00D/.1: HCPCS | Performed by: OPHTHALMOLOGY

## 2022-05-18 ENCOUNTER — DOCTOR'S OFFICE (OUTPATIENT)
Dept: URBAN - METROPOLITAN AREA CLINIC 125 | Facility: CLINIC | Age: 77
Setting detail: OPHTHALMOLOGY
End: 2022-05-18
Payer: COMMERCIAL

## 2022-05-18 DIAGNOSIS — H35.3132: ICD-10-CM

## 2022-05-18 DIAGNOSIS — H02.822: ICD-10-CM

## 2022-05-18 DIAGNOSIS — H16.223: ICD-10-CM

## 2022-05-18 PROCEDURE — 92202 OPSCPY EXTND ON/MAC DRAW: CPT | Performed by: OPHTHALMOLOGY

## 2022-05-18 PROCEDURE — 99214 OFFICE O/P EST MOD 30 MIN: CPT | Performed by: OPHTHALMOLOGY

## 2022-05-18 PROCEDURE — 92134 CPTRZ OPH DX IMG PST SGM RTA: CPT | Performed by: OPHTHALMOLOGY

## 2022-05-18 ASSESSMENT — REFRACTION_AUTOREFRACTION
OD_SPHERE: +1.50
OS_AXIS: 101
OD_CYLINDER: -2.25
OS_CYLINDER: -3.00
OS_SPHERE: +1.75
OD_AXIS: 083

## 2022-05-18 ASSESSMENT — AXIALLENGTH_DERIVED
OS_AL: 23.5004
OS_AL: 23.4042
OD_AL: 23.4467
OD_AL: 23.5919

## 2022-05-18 ASSESSMENT — REFRACTION_MANIFEST
OS_SPHERE: +1.00
OS_VA1: 20/25-
OS_CYLINDER: -2.00
OD_CYLINDER: -1.50
OD_VA1: 20/25-
OS_AXIS: 100
OD_AXIS: 080
OD_ADD: +2.25
OS_ADD: +2.25
OD_SPHERE: +0.75

## 2022-05-18 ASSESSMENT — VISUAL ACUITY
OD_BCVA: 20/60+2
OS_BCVA: 20/40-2

## 2022-05-18 ASSESSMENT — KERATOMETRY
OS_K2POWER_DIOPTERS: 44.75
OD_K2POWER_DIOPTERS: 44.25
OD_AXISANGLE_DEGREES: 075
OS_AXISANGLE_DEGREES: 102
OD_K1POWER_DIOPTERS: 42.75
OS_K1POWER_DIOPTERS: 42.75

## 2022-05-18 ASSESSMENT — CORNEAL PTERYGIUM: OS_PTERYGIUM: 1MM

## 2022-05-18 ASSESSMENT — SPHEQUIV_DERIVED
OD_SPHEQUIV: 0.375
OS_SPHEQUIV: 0.25
OD_SPHEQUIV: 0
OS_SPHEQUIV: 0

## 2022-05-18 ASSESSMENT — CORNEAL DYSTROPHY
OS_DYSTROPHY: ARCUS SENILIS
OD_DYSTROPHY: ARCUS SENILIS

## 2022-05-18 ASSESSMENT — CONFRONTATIONAL VISUAL FIELD TEST (CVF)
OS_FINDINGS: FULL
OD_FINDINGS: FULL

## 2022-07-22 ENCOUNTER — DOCTOR'S OFFICE (OUTPATIENT)
Dept: URBAN - METROPOLITAN AREA CLINIC 125 | Facility: CLINIC | Age: 77
Setting detail: OPHTHALMOLOGY
End: 2022-07-22
Payer: COMMERCIAL

## 2022-07-22 DIAGNOSIS — H35.3132: ICD-10-CM

## 2022-07-22 DIAGNOSIS — H16.223: ICD-10-CM

## 2022-07-22 PROCEDURE — 92134 CPTRZ OPH DX IMG PST SGM RTA: CPT | Performed by: OPHTHALMOLOGY

## 2022-07-22 PROCEDURE — 99213 OFFICE O/P EST LOW 20 MIN: CPT | Performed by: OPHTHALMOLOGY

## 2022-07-22 ASSESSMENT — KERATOMETRY
OD_K1POWER_DIOPTERS: 42.75
OD_AXISANGLE_DEGREES: 075
OS_K2POWER_DIOPTERS: 44.75
OS_K1POWER_DIOPTERS: 42.75
OS_AXISANGLE_DEGREES: 102
OD_K2POWER_DIOPTERS: 44.25

## 2022-07-22 ASSESSMENT — SPHEQUIV_DERIVED
OD_SPHEQUIV: 0.375
OD_SPHEQUIV: 0
OS_SPHEQUIV: 0.25
OS_SPHEQUIV: 0

## 2022-07-22 ASSESSMENT — REFRACTION_MANIFEST
OD_ADD: +2.25
OS_SPHERE: +1.00
OS_ADD: +2.25
OS_AXIS: 100
OS_VA1: 20/25-
OD_VA1: 20/25-
OD_SPHERE: +0.75
OD_AXIS: 080
OD_CYLINDER: -1.50
OS_CYLINDER: -2.00

## 2022-07-22 ASSESSMENT — REFRACTION_AUTOREFRACTION
OD_SPHERE: +1.50
OD_AXIS: 083
OS_AXIS: 101
OD_CYLINDER: -2.25
OS_SPHERE: +1.75
OS_CYLINDER: -3.00

## 2022-07-22 ASSESSMENT — CONFRONTATIONAL VISUAL FIELD TEST (CVF)
OS_FINDINGS: FULL
OD_FINDINGS: FULL

## 2022-07-22 ASSESSMENT — VISUAL ACUITY
OS_BCVA: 20/40-2
OD_BCVA: 20/70

## 2022-07-22 ASSESSMENT — AXIALLENGTH_DERIVED
OS_AL: 23.5004
OD_AL: 23.5919
OD_AL: 23.4467
OS_AL: 23.4042

## 2022-07-22 ASSESSMENT — CORNEAL DYSTROPHY
OS_DYSTROPHY: ARCUS SENILIS
OD_DYSTROPHY: ARCUS SENILIS

## 2022-07-22 ASSESSMENT — CORNEAL PTERYGIUM: OS_PTERYGIUM: 1MM

## 2022-08-26 ENCOUNTER — DOCTOR'S OFFICE (OUTPATIENT)
Dept: URBAN - METROPOLITAN AREA CLINIC 125 | Facility: CLINIC | Age: 77
Setting detail: OPHTHALMOLOGY
End: 2022-08-26
Payer: COMMERCIAL

## 2022-08-26 DIAGNOSIS — H35.3132: ICD-10-CM

## 2022-08-26 DIAGNOSIS — H16.223: ICD-10-CM

## 2022-08-26 DIAGNOSIS — H26.40: ICD-10-CM

## 2022-08-26 PROBLEM — Z96.1 PRESENCE OF INTRAOCULAR LENS ; RIGHT EYE: Status: ACTIVE | Noted: 2017-01-30

## 2022-08-26 PROBLEM — H11.062 PTERYGIUM; LEFT EYE: Status: ACTIVE | Noted: 2020-10-21

## 2022-08-26 PROBLEM — H52.223 ASTIGMATISM, REGULAR; BOTH EYES: Status: ACTIVE | Noted: 2021-07-07

## 2022-08-26 PROBLEM — H10.13 ALLERGIC CONJUNCTIVITIS; BOTH EYES: Status: ACTIVE | Noted: 2020-10-14

## 2022-08-26 PROCEDURE — 92134 CPTRZ OPH DX IMG PST SGM RTA: CPT | Performed by: OPHTHALMOLOGY

## 2022-08-26 PROCEDURE — 99213 OFFICE O/P EST LOW 20 MIN: CPT | Performed by: OPHTHALMOLOGY

## 2022-08-26 ASSESSMENT — REFRACTION_AUTOREFRACTION
OS_AXIS: 101
OD_AXIS: 083
OS_SPHERE: +1.75
OD_CYLINDER: -2.25
OS_CYLINDER: -3.00
OD_SPHERE: +1.50

## 2022-08-26 ASSESSMENT — AXIALLENGTH_DERIVED
OS_AL: 23.4042
OS_AL: 23.5004
OD_AL: 23.4467
OD_AL: 23.5919

## 2022-08-26 ASSESSMENT — CORNEAL PTERYGIUM: OS_PTERYGIUM: 1MM

## 2022-08-26 ASSESSMENT — REFRACTION_MANIFEST
OD_CYLINDER: -1.50
OS_SPHERE: +1.00
OS_VA1: 20/25-
OD_SPHERE: +0.75
OD_ADD: +2.25
OS_ADD: +2.25
OS_CYLINDER: -2.00
OD_VA1: 20/25-
OS_AXIS: 100
OD_AXIS: 080

## 2022-08-26 ASSESSMENT — KERATOMETRY
OD_K2POWER_DIOPTERS: 44.25
OD_AXISANGLE_DEGREES: 075
OS_K1POWER_DIOPTERS: 42.75
OS_K2POWER_DIOPTERS: 44.75
OS_AXISANGLE_DEGREES: 102
OD_K1POWER_DIOPTERS: 42.75

## 2022-08-26 ASSESSMENT — VISUAL ACUITY
OS_BCVA: 20/30+2
OD_BCVA: 20/60-1

## 2022-08-26 ASSESSMENT — SPHEQUIV_DERIVED
OS_SPHEQUIV: 0.25
OD_SPHEQUIV: 0.375
OD_SPHEQUIV: 0
OS_SPHEQUIV: 0

## 2022-08-26 ASSESSMENT — CONFRONTATIONAL VISUAL FIELD TEST (CVF)
OS_FINDINGS: FULL
OD_FINDINGS: FULL

## 2022-08-26 ASSESSMENT — CORNEAL DYSTROPHY
OD_DYSTROPHY: ARCUS SENILIS
OS_DYSTROPHY: ARCUS SENILIS

## 2022-12-14 ENCOUNTER — DOCTOR'S OFFICE (OUTPATIENT)
Dept: URBAN - METROPOLITAN AREA CLINIC 125 | Facility: CLINIC | Age: 77
Setting detail: OPHTHALMOLOGY
End: 2022-12-14
Payer: COMMERCIAL

## 2022-12-14 DIAGNOSIS — H16.223: ICD-10-CM

## 2022-12-14 DIAGNOSIS — H35.3132: ICD-10-CM

## 2022-12-14 PROBLEM — H53.122: Status: ACTIVE | Noted: 2022-12-14

## 2022-12-14 PROCEDURE — 92134 CPTRZ OPH DX IMG PST SGM RTA: CPT | Performed by: OPHTHALMOLOGY

## 2022-12-14 PROCEDURE — 99213 OFFICE O/P EST LOW 20 MIN: CPT | Performed by: OPHTHALMOLOGY

## 2022-12-14 ASSESSMENT — REFRACTION_AUTOREFRACTION
OD_SPHERE: +1.50
OS_CYLINDER: -3.00
OS_SPHERE: +1.75
OD_AXIS: 083
OS_AXIS: 101
OD_CYLINDER: -2.25

## 2022-12-14 ASSESSMENT — KERATOMETRY
OD_AXISANGLE_DEGREES: 075
OS_K1POWER_DIOPTERS: 42.75
OD_K2POWER_DIOPTERS: 44.25
OD_K1POWER_DIOPTERS: 42.75
OS_AXISANGLE_DEGREES: 102
OS_K2POWER_DIOPTERS: 44.75

## 2022-12-14 ASSESSMENT — CORNEAL PTERYGIUM: OS_PTERYGIUM: 1MM

## 2022-12-14 ASSESSMENT — SPHEQUIV_DERIVED
OD_SPHEQUIV: 0.375
OD_SPHEQUIV: 0
OS_SPHEQUIV: 0.25
OS_SPHEQUIV: 0

## 2022-12-14 ASSESSMENT — CONFRONTATIONAL VISUAL FIELD TEST (CVF)
OS_FINDINGS: FULL
OD_FINDINGS: FULL

## 2022-12-14 ASSESSMENT — REFRACTION_MANIFEST
OS_AXIS: 100
OS_SPHERE: +1.00
OD_CYLINDER: -1.50
OD_ADD: +2.25
OS_ADD: +2.25
OD_SPHERE: +0.75
OD_VA1: 20/25-
OS_VA1: 20/25-
OD_AXIS: 080
OS_CYLINDER: -2.00

## 2022-12-14 ASSESSMENT — CORNEAL DYSTROPHY
OD_DYSTROPHY: ARCUS SENILIS
OS_DYSTROPHY: ARCUS SENILIS

## 2022-12-14 ASSESSMENT — AXIALLENGTH_DERIVED
OD_AL: 23.4467
OD_AL: 23.5919
OS_AL: 23.4042
OS_AL: 23.5004

## 2022-12-14 ASSESSMENT — VISUAL ACUITY
OS_BCVA: 20/25
OD_BCVA: 20/25-1

## 2023-02-15 ENCOUNTER — DOCTOR'S OFFICE (OUTPATIENT)
Dept: URBAN - METROPOLITAN AREA CLINIC 125 | Facility: CLINIC | Age: 78
Setting detail: OPHTHALMOLOGY
End: 2023-02-15
Payer: COMMERCIAL

## 2023-02-15 DIAGNOSIS — H53.123: ICD-10-CM

## 2023-02-15 DIAGNOSIS — H16.223: ICD-10-CM

## 2023-02-15 DIAGNOSIS — H35.3132: ICD-10-CM

## 2023-02-15 PROCEDURE — 92250 FUNDUS PHOTOGRAPHY W/I&R: CPT | Performed by: OPHTHALMOLOGY

## 2023-02-15 PROCEDURE — 92235 FLUORESCEIN ANGRPH MLTIFRAME: CPT | Performed by: OPHTHALMOLOGY

## 2023-02-15 PROCEDURE — 99213 OFFICE O/P EST LOW 20 MIN: CPT | Performed by: OPHTHALMOLOGY

## 2023-02-15 ASSESSMENT — REFRACTION_AUTOREFRACTION
OD_CYLINDER: -2.25
OS_AXIS: 101
OD_SPHERE: +1.50
OS_CYLINDER: -3.00
OD_AXIS: 083
OS_SPHERE: +1.75

## 2023-02-15 ASSESSMENT — REFRACTION_MANIFEST
OD_SPHERE: +0.75
OS_ADD: +2.25
OD_AXIS: 080
OS_AXIS: 100
OS_SPHERE: +1.00
OS_VA1: 20/25-
OS_CYLINDER: -2.00
OD_VA1: 20/25-
OD_ADD: +2.25
OD_CYLINDER: -1.50

## 2023-02-15 ASSESSMENT — SPHEQUIV_DERIVED
OD_SPHEQUIV: 0.375
OS_SPHEQUIV: 0.25
OD_SPHEQUIV: 0
OS_SPHEQUIV: 0

## 2023-02-15 ASSESSMENT — KERATOMETRY
OD_K1POWER_DIOPTERS: 42.75
OS_K2POWER_DIOPTERS: 44.75
OD_AXISANGLE_DEGREES: 075
OD_K2POWER_DIOPTERS: 44.25
OS_K1POWER_DIOPTERS: 42.75
OS_AXISANGLE_DEGREES: 102

## 2023-02-15 ASSESSMENT — AXIALLENGTH_DERIVED
OD_AL: 23.4467
OS_AL: 23.4042
OD_AL: 23.5919
OS_AL: 23.5004

## 2023-02-15 ASSESSMENT — VISUAL ACUITY
OD_BCVA: 20/25+2
OS_BCVA: 20/20-2

## 2023-02-15 ASSESSMENT — CONFRONTATIONAL VISUAL FIELD TEST (CVF)
OS_FINDINGS: FULL
OD_FINDINGS: FULL

## 2023-02-15 ASSESSMENT — CORNEAL PTERYGIUM: OS_PTERYGIUM: 1MM

## 2023-02-15 ASSESSMENT — CORNEAL DYSTROPHY
OD_DYSTROPHY: ARCUS SENILIS
OS_DYSTROPHY: ARCUS SENILIS

## 2023-02-21 ENCOUNTER — DOCTOR'S OFFICE (OUTPATIENT)
Dept: URBAN - METROPOLITAN AREA CLINIC 125 | Facility: CLINIC | Age: 78
Setting detail: OPHTHALMOLOGY
End: 2023-02-21
Payer: COMMERCIAL

## 2023-02-21 DIAGNOSIS — H16.221: ICD-10-CM

## 2023-02-21 DIAGNOSIS — H16.222: ICD-10-CM

## 2023-02-21 PROBLEM — H53.122: Status: ACTIVE | Noted: 2023-02-21

## 2023-02-21 PROCEDURE — 68761 CLOSE TEAR DUCT OPENING: CPT | Performed by: OPHTHALMOLOGY

## 2023-02-21 ASSESSMENT — SPHEQUIV_DERIVED
OD_SPHEQUIV: 0
OD_SPHEQUIV: 0.375
OS_SPHEQUIV: 0
OS_SPHEQUIV: 0.25

## 2023-02-21 ASSESSMENT — REFRACTION_AUTOREFRACTION
OD_CYLINDER: -2.25
OD_AXIS: 083
OS_AXIS: 101
OD_SPHERE: +1.50
OS_SPHERE: +1.75
OS_CYLINDER: -3.00

## 2023-02-21 ASSESSMENT — VISUAL ACUITY
OD_BCVA: 20/25+2
OS_BCVA: 20/20-2

## 2023-02-21 ASSESSMENT — REFRACTION_MANIFEST
OD_VA1: 20/25-
OD_ADD: +2.25
OS_CYLINDER: -2.00
OD_CYLINDER: -1.50
OS_VA1: 20/25-
OD_AXIS: 080
OD_SPHERE: +0.75
OS_SPHERE: +1.00
OS_AXIS: 100
OS_ADD: +2.25

## 2023-02-21 ASSESSMENT — AXIALLENGTH_DERIVED
OS_AL: 23.5004
OS_AL: 23.4042
OD_AL: 23.5919
OD_AL: 23.4467

## 2023-02-21 ASSESSMENT — KERATOMETRY
OD_K1POWER_DIOPTERS: 42.75
OS_AXISANGLE_DEGREES: 102
OS_K1POWER_DIOPTERS: 42.75
OD_K2POWER_DIOPTERS: 44.25
OS_K2POWER_DIOPTERS: 44.75
OD_AXISANGLE_DEGREES: 075

## 2023-03-21 ENCOUNTER — DOCTOR'S OFFICE (OUTPATIENT)
Dept: URBAN - METROPOLITAN AREA CLINIC 125 | Facility: CLINIC | Age: 78
Setting detail: OPHTHALMOLOGY
End: 2023-03-21
Payer: COMMERCIAL

## 2023-03-21 DIAGNOSIS — H53.123: ICD-10-CM

## 2023-03-21 DIAGNOSIS — H16.223: ICD-10-CM

## 2023-03-21 DIAGNOSIS — H35.3132: ICD-10-CM

## 2023-03-21 PROCEDURE — 92134 CPTRZ OPH DX IMG PST SGM RTA: CPT | Performed by: OPHTHALMOLOGY

## 2023-03-21 PROCEDURE — 99213 OFFICE O/P EST LOW 20 MIN: CPT | Performed by: OPHTHALMOLOGY

## 2023-03-21 ASSESSMENT — CORNEAL PTERYGIUM: OS_PTERYGIUM: 1MM

## 2023-03-21 ASSESSMENT — CORNEAL DYSTROPHY
OS_DYSTROPHY: ARCUS SENILIS
OD_DYSTROPHY: ARCUS SENILIS

## 2023-03-21 ASSESSMENT — REFRACTION_AUTOREFRACTION
OD_SPHERE: +1.50
OD_AXIS: 083
OS_AXIS: 101
OS_SPHERE: +1.75
OS_CYLINDER: -3.00
OD_CYLINDER: -2.25

## 2023-03-21 ASSESSMENT — REFRACTION_MANIFEST
OS_SPHERE: +1.00
OD_AXIS: 080
OS_CYLINDER: -2.00
OD_SPHERE: +0.75
OD_CYLINDER: -1.50
OD_VA1: 20/25-
OS_ADD: +2.25
OD_ADD: +2.25
OS_VA1: 20/25-
OS_AXIS: 100

## 2023-03-21 ASSESSMENT — KERATOMETRY
OS_AXISANGLE_DEGREES: 102
OS_K1POWER_DIOPTERS: 42.75
OD_AXISANGLE_DEGREES: 075
OS_K2POWER_DIOPTERS: 44.75
OD_K1POWER_DIOPTERS: 42.75
OD_K2POWER_DIOPTERS: 44.25

## 2023-03-21 ASSESSMENT — CONFRONTATIONAL VISUAL FIELD TEST (CVF)
OD_FINDINGS: FULL
OS_FINDINGS: FULL

## 2023-03-21 ASSESSMENT — AXIALLENGTH_DERIVED
OS_AL: 23.5004
OD_AL: 23.5919
OD_AL: 23.4467
OS_AL: 23.4042

## 2023-03-21 ASSESSMENT — SPHEQUIV_DERIVED
OD_SPHEQUIV: 0.375
OS_SPHEQUIV: 0
OS_SPHEQUIV: 0.25
OD_SPHEQUIV: 0

## 2023-03-21 ASSESSMENT — VISUAL ACUITY
OS_BCVA: 20/30+2
OD_BCVA: 20/70+2

## 2023-03-21 ASSESSMENT — PUNCTA - ASSESSMENT
OS_PUNCTA: LLL SMALL
OD_PUNCTA: RLL SMALL

## 2023-03-29 ENCOUNTER — DOCTOR'S OFFICE (OUTPATIENT)
Dept: URBAN - METROPOLITAN AREA CLINIC 125 | Facility: CLINIC | Age: 78
Setting detail: OPHTHALMOLOGY
End: 2023-03-29
Payer: COMMERCIAL

## 2023-03-29 DIAGNOSIS — H16.221: ICD-10-CM

## 2023-03-29 DIAGNOSIS — H16.222: ICD-10-CM

## 2023-03-29 PROBLEM — H53.122: Status: ACTIVE | Noted: 2023-03-29

## 2023-03-29 PROCEDURE — 68761 CLOSE TEAR DUCT OPENING: CPT | Performed by: OPHTHALMOLOGY

## 2023-03-29 ASSESSMENT — KERATOMETRY
OS_K2POWER_DIOPTERS: 44.75
OD_K1POWER_DIOPTERS: 42.75
OS_K1POWER_DIOPTERS: 42.75
OD_K2POWER_DIOPTERS: 44.25
OD_AXISANGLE_DEGREES: 075
OS_AXISANGLE_DEGREES: 102

## 2023-03-29 ASSESSMENT — AXIALLENGTH_DERIVED
OD_AL: 23.4467
OS_AL: 23.5004
OS_AL: 23.4042
OD_AL: 23.5919

## 2023-03-29 ASSESSMENT — REFRACTION_MANIFEST
OS_AXIS: 100
OD_CYLINDER: -1.50
OD_AXIS: 080
OS_CYLINDER: -2.00
OS_VA1: 20/25-
OS_SPHERE: +1.00
OD_ADD: +2.25
OS_ADD: +2.25
OD_VA1: 20/25-
OD_SPHERE: +0.75

## 2023-03-29 ASSESSMENT — REFRACTION_AUTOREFRACTION
OD_CYLINDER: -2.25
OD_SPHERE: +1.50
OS_AXIS: 101
OD_AXIS: 083
OS_CYLINDER: -3.00
OS_SPHERE: +1.75

## 2023-03-29 ASSESSMENT — SPHEQUIV_DERIVED
OS_SPHEQUIV: 0.25
OD_SPHEQUIV: 0.375
OD_SPHEQUIV: 0
OS_SPHEQUIV: 0

## 2023-03-29 ASSESSMENT — VISUAL ACUITY
OD_BCVA: 20/70+2
OS_BCVA: 20/30+2

## 2023-04-18 ENCOUNTER — DOCTOR'S OFFICE (OUTPATIENT)
Dept: URBAN - METROPOLITAN AREA CLINIC 125 | Facility: CLINIC | Age: 78
Setting detail: OPHTHALMOLOGY
End: 2023-04-18
Payer: COMMERCIAL

## 2023-04-18 DIAGNOSIS — H16.212: ICD-10-CM

## 2023-04-18 DIAGNOSIS — H35.3132: ICD-10-CM

## 2023-04-18 DIAGNOSIS — H53.123: ICD-10-CM

## 2023-04-18 DIAGNOSIS — H16.223: ICD-10-CM

## 2023-04-18 PROBLEM — H53.122: Status: ACTIVE | Noted: 2023-04-18

## 2023-04-18 PROCEDURE — 99214 OFFICE O/P EST MOD 30 MIN: CPT | Performed by: OPHTHALMOLOGY

## 2023-04-18 PROCEDURE — 92134 CPTRZ OPH DX IMG PST SGM RTA: CPT | Performed by: OPHTHALMOLOGY

## 2023-04-18 ASSESSMENT — CORNEAL DYSTROPHY
OD_DYSTROPHY: ARCUS SENILIS
OS_DYSTROPHY: ARCUS SENILIS

## 2023-04-18 ASSESSMENT — CORNEAL PTERYGIUM: OS_PTERYGIUM: 1MM

## 2023-04-18 ASSESSMENT — CONFRONTATIONAL VISUAL FIELD TEST (CVF)
OS_FINDINGS: FULL
OD_FINDINGS: FULL

## 2023-04-18 ASSESSMENT — REFRACTION_AUTOREFRACTION
OD_AXIS: 083
OS_CYLINDER: -3.00
OS_AXIS: 101
OD_CYLINDER: -2.25
OS_SPHERE: +1.75
OD_SPHERE: +1.50

## 2023-04-18 ASSESSMENT — REFRACTION_MANIFEST
OD_CYLINDER: -1.50
OS_AXIS: 100
OS_VA1: 20/25-
OS_SPHERE: +1.00
OD_VA1: 20/25-
OD_SPHERE: +0.75
OS_ADD: +2.25
OS_CYLINDER: -2.00
OD_AXIS: 080
OD_ADD: +2.25

## 2023-04-18 ASSESSMENT — SUPERFICIAL PUNCTATE KERATITIS (SPK)
OD_SPK: 1+
OS_SPK: 1+

## 2023-04-18 ASSESSMENT — VISUAL ACUITY
OD_BCVA: 20/50-1
OS_BCVA: 20/30

## 2023-04-18 ASSESSMENT — KERATOMETRY
OS_K2POWER_DIOPTERS: 44.75
OS_K1POWER_DIOPTERS: 42.75
OD_AXISANGLE_DEGREES: 075
OD_K1POWER_DIOPTERS: 42.75
OD_K2POWER_DIOPTERS: 44.25
OS_AXISANGLE_DEGREES: 102

## 2023-04-18 ASSESSMENT — AXIALLENGTH_DERIVED
OD_AL: 23.4467
OD_AL: 23.5919
OS_AL: 23.4042
OS_AL: 23.5004

## 2023-04-18 ASSESSMENT — SPHEQUIV_DERIVED
OS_SPHEQUIV: 0.25
OD_SPHEQUIV: 0
OD_SPHEQUIV: 0.375
OS_SPHEQUIV: 0

## 2023-04-18 ASSESSMENT — LID EXAM ASSESSMENTS: OS_BLEPHARITIS: T

## 2023-04-18 ASSESSMENT — PUNCTA - ASSESSMENT
OS_PUNCTA: LLL SMALL
OD_PUNCTA: RLL SMALL

## 2024-11-07 ENCOUNTER — APPOINTMENT (EMERGENCY)
Dept: RADIOLOGY | Facility: HOSPITAL | Age: 79
End: 2024-11-07
Payer: COMMERCIAL

## 2024-11-07 ENCOUNTER — HOSPITAL ENCOUNTER (EMERGENCY)
Facility: HOSPITAL | Age: 79
Discharge: HOME | End: 2024-11-07
Attending: EMERGENCY MEDICINE
Payer: COMMERCIAL

## 2024-11-07 VITALS
DIASTOLIC BLOOD PRESSURE: 75 MMHG | OXYGEN SATURATION: 95 % | TEMPERATURE: 97.8 F | RESPIRATION RATE: 16 BRPM | SYSTOLIC BLOOD PRESSURE: 149 MMHG | HEART RATE: 72 BPM

## 2024-11-07 DIAGNOSIS — R07.9 CHEST PAIN: Primary | ICD-10-CM

## 2024-11-07 LAB
ALBUMIN SERPL BCG-MCNC: 3.7 G/DL (ref 3.5–5)
ALP SERPL-CCNC: 69 U/L (ref 34–104)
ALT SERPL W P-5'-P-CCNC: 14 U/L (ref 7–52)
ANION GAP SERPL CALCULATED.3IONS-SCNC: 3 MMOL/L (ref 4–13)
AST SERPL W P-5'-P-CCNC: 23 U/L (ref 13–39)
ATRIAL RATE: 80 BPM
BASOPHILS # BLD AUTO: 0.01 THOUSANDS/ÂΜL (ref 0–0.1)
BASOPHILS NFR BLD AUTO: 0 % (ref 0–1)
BILIRUB SERPL-MCNC: 0.79 MG/DL (ref 0.2–1)
BUN SERPL-MCNC: 13 MG/DL (ref 5–25)
CALCIUM SERPL-MCNC: 9.3 MG/DL (ref 8.4–10.2)
CARDIAC TROPONIN I PNL SERPL HS: 3 NG/L
CHLORIDE SERPL-SCNC: 104 MMOL/L (ref 96–108)
CO2 SERPL-SCNC: 31 MMOL/L (ref 21–32)
CREAT SERPL-MCNC: 0.83 MG/DL (ref 0.6–1.3)
EOSINOPHIL # BLD AUTO: 0.04 THOUSAND/ÂΜL (ref 0–0.61)
EOSINOPHIL NFR BLD AUTO: 1 % (ref 0–6)
ERYTHROCYTE [DISTWIDTH] IN BLOOD BY AUTOMATED COUNT: 12.5 % (ref 11.6–15.1)
GFR SERPL CREATININE-BSD FRML MDRD: 67 ML/MIN/1.73SQ M
GLUCOSE SERPL-MCNC: 90 MG/DL (ref 65–140)
HCT VFR BLD AUTO: 44 % (ref 34.8–46.1)
HGB BLD-MCNC: 14.2 G/DL (ref 11.5–15.4)
IMM GRANULOCYTES # BLD AUTO: 0.01 THOUSAND/UL (ref 0–0.2)
IMM GRANULOCYTES NFR BLD AUTO: 0 % (ref 0–2)
LYMPHOCYTES # BLD AUTO: 2.07 THOUSANDS/ÂΜL (ref 0.6–4.47)
LYMPHOCYTES NFR BLD AUTO: 46 % (ref 14–44)
MCH RBC QN AUTO: 29.8 PG (ref 26.8–34.3)
MCHC RBC AUTO-ENTMCNC: 32.3 G/DL (ref 31.4–37.4)
MCV RBC AUTO: 92 FL (ref 82–98)
MONOCYTES # BLD AUTO: 0.41 THOUSAND/ÂΜL (ref 0.17–1.22)
MONOCYTES NFR BLD AUTO: 9 % (ref 4–12)
NEUTROPHILS # BLD AUTO: 1.97 THOUSANDS/ÂΜL (ref 1.85–7.62)
NEUTS SEG NFR BLD AUTO: 44 % (ref 43–75)
NRBC BLD AUTO-RTO: 0 /100 WBCS
P AXIS: 46 DEGREES
PLATELET # BLD AUTO: 238 THOUSANDS/UL (ref 149–390)
PMV BLD AUTO: 9.9 FL (ref 8.9–12.7)
POTASSIUM SERPL-SCNC: 4.2 MMOL/L (ref 3.5–5.3)
PR INTERVAL: 154 MS
PROT SERPL-MCNC: 7.1 G/DL (ref 6.4–8.4)
QRS AXIS: 12 DEGREES
QRSD INTERVAL: 82 MS
QT INTERVAL: 356 MS
QTC INTERVAL: 411 MS
RBC # BLD AUTO: 4.76 MILLION/UL (ref 3.81–5.12)
SODIUM SERPL-SCNC: 138 MMOL/L (ref 135–147)
T WAVE AXIS: 27 DEGREES
VENTRICULAR RATE: 80 BPM
WBC # BLD AUTO: 4.51 THOUSAND/UL (ref 4.31–10.16)

## 2024-11-07 PROCEDURE — 84484 ASSAY OF TROPONIN QUANT: CPT | Performed by: EMERGENCY MEDICINE

## 2024-11-07 PROCEDURE — 36415 COLL VENOUS BLD VENIPUNCTURE: CPT

## 2024-11-07 PROCEDURE — 71046 X-RAY EXAM CHEST 2 VIEWS: CPT

## 2024-11-07 PROCEDURE — 93005 ELECTROCARDIOGRAM TRACING: CPT

## 2024-11-07 PROCEDURE — 80053 COMPREHEN METABOLIC PANEL: CPT | Performed by: EMERGENCY MEDICINE

## 2024-11-07 PROCEDURE — 99285 EMERGENCY DEPT VISIT HI MDM: CPT | Performed by: EMERGENCY MEDICINE

## 2024-11-07 PROCEDURE — 99285 EMERGENCY DEPT VISIT HI MDM: CPT

## 2024-11-07 PROCEDURE — 85025 COMPLETE CBC W/AUTO DIFF WBC: CPT | Performed by: EMERGENCY MEDICINE

## 2024-11-07 PROCEDURE — 93010 ELECTROCARDIOGRAM REPORT: CPT | Performed by: INTERNAL MEDICINE

## 2024-11-07 RX ORDER — ACETAMINOPHEN 325 MG/1
975 TABLET ORAL ONCE
Status: COMPLETED | OUTPATIENT
Start: 2024-11-07 | End: 2024-11-07

## 2024-11-07 RX ORDER — LIDOCAINE 50 MG/G
1 PATCH TOPICAL ONCE
Status: DISCONTINUED | OUTPATIENT
Start: 2024-11-07 | End: 2024-11-07 | Stop reason: HOSPADM

## 2024-11-07 RX ORDER — IBUPROFEN 400 MG/1
400 TABLET, FILM COATED ORAL ONCE
Status: COMPLETED | OUTPATIENT
Start: 2024-11-07 | End: 2024-11-07

## 2024-11-07 RX ADMIN — LIDOCAINE 1 PATCH: 50 PATCH TOPICAL at 15:07

## 2024-11-07 RX ADMIN — ACETAMINOPHEN 975 MG: 325 TABLET, FILM COATED ORAL at 15:07

## 2024-11-07 RX ADMIN — IBUPROFEN 400 MG: 400 TABLET, FILM COATED ORAL at 15:07

## 2024-11-07 NOTE — ED PROVIDER NOTES
"  ED Disposition       None          Assessment & Plan   {Hyperlinks  Risk Stratification - NIHSS - HEART SCORE - Fill out sepsis note and make sure you call [phone] if severe or septic shock:[id number]}    MDM  Patient is a 79 y.o. female with PMH of *** who presents to the ED with ***.    Vital signs ***. On exam ***.    History and physical exam most consistent with ***. However, differential diagnosis included but not limited to ***.     Plan: ***    View ED course for further discussion on patient workup.     On review of previous records patient taking Lasix daily.    All labs reviewed and utilized in the medical decision making process  All radiology studies independently viewed by me and interpreted by the radiologist.  I reviewed all testing with the patient.     Upon re-evaluation ***.    Disposition: ***    Portions of the record may have been created with voice recognition software. Occasional wrong word or \"sound a like\" substitutions may have occurred due to the inherent limitations of voice recognition software. Read the chart carefully and recognize, using context, where substitutions have occurred.    ED Course as of 11/07/24 1423   Thu Nov 07, 2024   1421 Hemoglobin: 14.2  No evidence of anemia    1421 WBC: 4.51  No acute leukocytosis    1421 Comprehensive metabolic panel(!)  No acute electrolyte abnormalities, normal LFTs       Medications - No data to display    ED Risk Strat Scores                                               History of Present Illness   {Hyperlinks  History (Med, Surg, Fam, Social) - Current Medications - Allergies  :[id number]}    Chief Complaint   Patient presents with    Chest Pain     Pt reports midsternal chest pain starting one month that radiates to back and R arm. Hx asthma, pt denies new/worsening sob, also denies numbness/tingling and n/v. Pt reports intermittent palpitations.       Past Medical History:   Diagnosis Date    Asthma     Hypertension       Past Surgical " History:   Procedure Laterality Date    HYSTERECTOMY      TONSILLECTOMY      US GUIDED LIVER BIOPSY  09/27/2013      History reviewed. No pertinent family history.   Social History     Tobacco Use    Smoking status: Never    Smokeless tobacco: Never   Substance Use Topics    Alcohol use: Never    Drug use: Never      E-Cigarette/Vaping      E-Cigarette/Vaping Substances      I have reviewed and agree with the history as documented.     HPI  Patient is a 79 y.o. female with history of *** presenting to the emergency department for ***. Patient states that ***.     Review of Systems        Objective   {Hyperlinks  Historical Vitals - Historical Labs - Chart Review/Microbiology - Last Echo - Code Status  :[id number]}    ED Triage Vitals [11/07/24 1212]   Temperature Pulse Blood Pressure Respirations SpO2 Patient Position - Orthostatic VS   97.8 °F (36.6 °C) 80 139/79 18 99 % Sitting      Temp Source Heart Rate Source BP Location FiO2 (%) Pain Score    Tympanic Monitor Left arm -- --      Vitals      Date and Time Temp Pulse SpO2 Resp BP Pain Score FACES Pain Rating User   11/07/24 1212 97.8 °F (36.6 °C) 80 99 % 18 139/79 -- -- BL            Physical Exam    Results Reviewed       Procedure Component Value Units Date/Time    HS Troponin I 2hr [[id number]]     Lab Status: No result Specimen: Blood     HS Troponin 0hr (reflex protocol) [[id number]]  (Normal) Collected: 11/07/24 1232    Lab Status: Final result Specimen: Blood from Arm, Left Updated: 11/07/24 1311     hs TnI 0hr 3 ng/L     Comprehensive metabolic panel [[id number]]  (Abnormal) Collected: 11/07/24 1232    Lab Status: Final result Specimen: Blood from Arm, Left Updated: 11/07/24 1309     Sodium 138 mmol/L      Potassium 4.2 mmol/L      Chloride 104 mmol/L      CO2 31 mmol/L      ANION GAP 3 mmol/L      BUN 13 mg/dL      Creatinine 0.83 mg/dL      Glucose 90 mg/dL      Calcium 9.3 mg/dL      AST 23 U/L      ALT 14 U/L      Alkaline Phosphatase 69 U/L      Total  Protein 7.1 g/dL      Albumin 3.7 g/dL      Total Bilirubin 0.79 mg/dL      eGFR 67 ml/min/1.73sq m     Narrative:      National Kidney Disease Foundation guidelines for Chronic Kidney Disease (CKD):     Stage 1 with normal or high GFR (GFR > 90 mL/min/1.73 square meters)    Stage 2 Mild CKD (GFR = 60-89 mL/min/1.73 square meters)    Stage 3A Moderate CKD (GFR = 45-59 mL/min/1.73 square meters)    Stage 3B Moderate CKD (GFR = 30-44 mL/min/1.73 square meters)    Stage 4 Severe CKD (GFR = 15-29 mL/min/1.73 square meters)    Stage 5 End Stage CKD (GFR <15 mL/min/1.73 square meters)  Note: GFR calculation is accurate only with a steady state creatinine    CBC and differential [[id number]]  (Abnormal) Collected: 11/07/24 1232    Lab Status: Final result Specimen: Blood from Arm, Left Updated: 11/07/24 1252     WBC 4.51 Thousand/uL      RBC 4.76 Million/uL      Hemoglobin 14.2 g/dL      Hematocrit 44.0 %      MCV 92 fL      MCH 29.8 pg      MCHC 32.3 g/dL      RDW 12.5 %      MPV 9.9 fL      Platelets 238 Thousands/uL      nRBC 0 /100 WBCs      Segmented % 44 %      Immature Grans % 0 %      Lymphocytes % 46 %      Monocytes % 9 %      Eosinophils Relative 1 %      Basophils Relative 0 %      Absolute Neutrophils 1.97 Thousands/µL      Absolute Immature Grans 0.01 Thousand/uL      Absolute Lymphocytes 2.07 Thousands/µL      Absolute Monocytes 0.41 Thousand/µL      Eosinophils Absolute 0.04 Thousand/µL      Basophils Absolute 0.01 Thousands/µL             XR chest 1 view portable    (Results Pending)       Procedures    ED Medication and Procedure Management   None     Patient's Medications    No medications on file     No discharge procedures on file.  ED SEPSIS DOCUMENTATION          11/07/24 1212   97.8 °F (36.6 °C) 80 139/79 18 99 % Sitting      Temp Source Heart Rate Source BP Location FiO2 (%) Pain Score    11/07/24 1212 11/07/24 1212 11/07/24 1212 -- 11/07/24 1507    Tympanic Monitor Left arm  1      Vitals      Date and Time Temp Pulse SpO2 Resp BP Pain Score FACES Pain Rating User   11/07/24 1530 -- 72 95 % 16 149/75 -- -- BL   11/07/24 1507 -- -- -- -- -- 1 -- BL   11/07/24 1212 97.8 °F (36.6 °C) 80 99 % 18 139/79 -- -- BL            Physical Exam  Vitals and nursing note reviewed.   Constitutional:       Appearance: Normal appearance.   HENT:      Head: Normocephalic and atraumatic.      Mouth/Throat:      Mouth: Mucous membranes are moist.   Eyes:      Conjunctiva/sclera: Conjunctivae normal.   Cardiovascular:      Rate and Rhythm: Normal rate and regular rhythm.      Pulses: Normal pulses.      Heart sounds: Normal heart sounds.   Pulmonary:      Effort: Pulmonary effort is normal.      Breath sounds: Normal breath sounds.   Chest:      Chest wall: Tenderness present.      Comments: Reproducible left lower chest wall tenderness to palpation  Abdominal:      Palpations: Abdomen is soft.      Tenderness: There is no abdominal tenderness.   Musculoskeletal:         General: No tenderness.      Cervical back: Neck supple.   Skin:     General: Skin is warm and dry.      Capillary Refill: Capillary refill takes less than 2 seconds.   Neurological:      General: No focal deficit present.      Mental Status: She is alert. Mental status is at baseline.   Psychiatric:         Mood and Affect: Mood normal.         Results Reviewed       Procedure Component Value Units Date/Time    HS Troponin 0hr (reflex protocol) [[id number]]  (Normal) Collected: 11/07/24 1232    Lab Status: Final result Specimen: Blood from Arm, Left Updated: 11/07/24 1311     hs TnI 0hr 3 ng/L     Comprehensive metabolic panel [[id number]]  (Abnormal) Collected: 11/07/24 1232    Lab Status: Final result Specimen: Blood from  Arm, Left Updated: 11/07/24 1309     Sodium 138 mmol/L      Potassium 4.2 mmol/L      Chloride 104 mmol/L      CO2 31 mmol/L      ANION GAP 3 mmol/L      BUN 13 mg/dL      Creatinine 0.83 mg/dL      Glucose 90 mg/dL      Calcium 9.3 mg/dL      AST 23 U/L      ALT 14 U/L      Alkaline Phosphatase 69 U/L      Total Protein 7.1 g/dL      Albumin 3.7 g/dL      Total Bilirubin 0.79 mg/dL      eGFR 67 ml/min/1.73sq m     Narrative:      National Kidney Disease Foundation guidelines for Chronic Kidney Disease (CKD):     Stage 1 with normal or high GFR (GFR > 90 mL/min/1.73 square meters)    Stage 2 Mild CKD (GFR = 60-89 mL/min/1.73 square meters)    Stage 3A Moderate CKD (GFR = 45-59 mL/min/1.73 square meters)    Stage 3B Moderate CKD (GFR = 30-44 mL/min/1.73 square meters)    Stage 4 Severe CKD (GFR = 15-29 mL/min/1.73 square meters)    Stage 5 End Stage CKD (GFR <15 mL/min/1.73 square meters)  Note: GFR calculation is accurate only with a steady state creatinine    CBC and differential [[id number]]  (Abnormal) Collected: 11/07/24 1232    Lab Status: Final result Specimen: Blood from Arm, Left Updated: 11/07/24 1252     WBC 4.51 Thousand/uL      RBC 4.76 Million/uL      Hemoglobin 14.2 g/dL      Hematocrit 44.0 %      MCV 92 fL      MCH 29.8 pg      MCHC 32.3 g/dL      RDW 12.5 %      MPV 9.9 fL      Platelets 238 Thousands/uL      nRBC 0 /100 WBCs      Segmented % 44 %      Immature Grans % 0 %      Lymphocytes % 46 %      Monocytes % 9 %      Eosinophils Relative 1 %      Basophils Relative 0 %      Absolute Neutrophils 1.97 Thousands/µL      Absolute Immature Grans 0.01 Thousand/uL      Absolute Lymphocytes 2.07 Thousands/µL      Absolute Monocytes 0.41 Thousand/µL      Eosinophils Absolute 0.04 Thousand/µL      Basophils Absolute 0.01 Thousands/µL             XR chest 2 views   ED Interpretation by Estrella Mancilla DO (11/07 1629)   Elevated right hemidiaphragm. No evidence of pneumonia, pneumothorax, pleural effusion       Final Interpretation by Yvan Moctezuma MD (11/08 0717)      No acute cardiopulmonary disease.            Workstation performed: [id number]             Procedures    ED Medication and Procedure Management   None     There are no discharge medications for this patient.    No discharge procedures on file.  ED SEPSIS DOCUMENTATION   Time reflects when diagnosis was documented in both MDM as applicable and the Disposition within this note       Time User Action Codes Description Comment    11/7/2024  4:29 PM Estrella Mancilla Add [R07.9] Chest pain                  Estrella Mancilla DO  11/15/24 0058

## 2024-11-07 NOTE — DISCHARGE INSTRUCTIONS
You were seen in the emergency department today for chest pain.    Your testing showed no acute abnormality.    Follow up with your primary care provider.   Follow up with your cardiologist as scheduled tomorrow.     Take Tylenol / Ibuprofen as needed following the instructions on the bottle as needed for pain.     Return to the emergency department for any new or concerning symptoms including worsening pain especially with exertion.     Thank you for choosing [facility] for your care today.

## 2024-11-07 NOTE — ED ATTENDING ATTESTATION
Final Diagnoses:   No diagnosis found.  ED Course as of 11/07/24 1505   Thu Nov 07, 2024   1503 POCUS Cardiac/IVC + POCUS Lung:  - transthoracic echocardiogram was performed at bedside by myself   - Images collected were POOR quality.   - This was a technically difficult study due to lung interference.   - Apical, parasternal, subcostal views were obtained/attempted.   - The main purpose of this study was to r/o obvious pathology requiring emergent intervention as in summary.   - Many views/images obtained for educational reasons.   - Findings:    There was no obvious pericardial effusion:   LV     Function: grossly Low-Normal appearing (poor visualization, only off-axis images)    E-Wave 61    A-Wave 74    E/A Ratio 0.82     E' 7    E/E' Ratio 8.22     Diastology: Grade 2 Diastolic dysfunction Mitral Inflow: E/A > 0.8 Tissue Doppler: e' < 8cm/s E/e': 8-15   RV     Function grossly normal appearing.     S' = 9 (normal 10+) (OFF AXIS)   IVC: IVC < 2.1cm; >50% compression w/ sniff likely RAP 3 mmHg   Lungs:    There was no obvious pleural effusion.     B-lines were not present anteriorly bilaterally at upper BLUE-point (3+ B-lines in 2+ fields w/ concern for HF/Cardiogenic pulmonary edema sensitivity 85-94%, specificity 92% LR+ 7.4-12; LR- 0.06-0.16)    Bilateral anterior lung sliding bilaterally present at upper BLUE-point (no pneumothorax, sen 91%; spec 98%)   Valves:    There was no obvious MR regurgitation.    There was no obvious TR regurgitation.    There was no obvious AR regurgitation    There was no obvious LA dilation.    There was no obvious RA dilation.     Summary:   - DD2  - Poor quality imaging.   - There were no obvious signs of fluid overload (B-lines, plump IVC)  - There was no obvious anterior pneumothorax  - There was no large pericardial effusion       I, Irineo Sahu MD, saw and evaluated the patient. All available labs and X-rays were ordered by me or the resident / non-physician and have  been reviewed by myself. I discussed the patient with the resident / non-physician and agree with the resident's / non-physician practitioner's findings and plan as documented in the resident's / non-physician practicitioner's note, except where noted.   At this point, I agree with the current assessment done in the ED.   I was present during key portions of all procedures performed unless otherwise stated.     HPI:  NURSING TRIAGE:    This is a 79 y.o. female presenting for evaluation of CP x1 month.   Reproducible with palpation.  Non exertioanl  No SOB.  Sometimes in certain positions she feels like her heart is beating funny, but not now.  No leg swelling, on Lasix.  Very reproducible below left breast  No shingles  No falls  No trauma    PMH: COPD HTN Chief Complaint   Patient presents with    Chest Pain     Pt reports midsternal chest pain starting one month that radiates to back and R arm. Hx asthma, pt denies new/worsening sob, also denies numbness/tingling and n/v. Pt reports intermittent palpitations.      PHYSICAL: ASSESSMENT + PLAN:   General: VS reviewed  Appears in NAD  awake, alert.   Well-nourished, well-developed. Appears stated age.   Speaking normally in full sentences.   Head: Normocephalic, atraumatic  Eyes: EOM-I. No diplopia.   No hyphema.   No subconjunctival hemorrhages.  Symmetrical lids.   ENT: Atraumatic external nose and ears.    MMM  No malocclusion. No stridor. Normal phonation. No drooling. Normal swallowing.   Neck: No JVD.  CV: No pallor noted  No tachycardia  +reproducible tenderness  Lungs:   No tachypnea  No respiratory distress  Abd: soft nt nd no rebound/guarding  MSK:   FROM spontaneously  Skin: Dry, intact.   Neuro: Awake, alert, GCS15, CN II-XII grossly intact.   Motor grossly intact.  Psychiatric/Behavioral: interacting normally; appropriate mood/affect.    Exam: deferred    Vitals:    11/07/24 1212   BP: 139/79   BP Location: Left arm   Pulse: 80   Resp: 18   Temp: 97.8  °F (36.6 °C)   TempSrc: Tympanic   SpO2: 99%    Has cards f/u tomorrow.  Came in today bc hasn't used anything for pain.    - Given patient's concerns, will do a cardiac workup.   - Will do an EKG for arrythmia, strain; troponin for same as per protocol for evaluation of ACS.   - CBC for anemia; CMP for kidney function and electrolytes.   - Will check CXR for pneumonia, PTX, fluid overload  ***- Will do POCUS Cardiac to evaluate for pericardial effusion.   HEART score:  History 0=Slightly or non-suspicious   ECG 0=Normal   Age 2= > 65 years   Risk Factors 1= 1 or 2 risk factors   Troponin 0= Less than or equal to 12 ng/L   Total 3   - Disposition per workup.     Past Medical History:   Diagnosis Date    Asthma     Hypertension         There are no obvious limitations to social determinants of care.   Nursing note reviewed.   Vitals reviewed.   Orders placed by myself and/or advanced practitioner / resident.    Previous chart was reviewed  No language barrier.   History obtained from patient.    There are no limitations to the history obtained:     Past Medical: Past Surgical:    has a past medical history of Asthma and Hypertension.  has a past surgical history that includes US guided liver biopsy (09/27/2013); Hysterectomy; and Tonsillectomy.   Social: Cardiac (Echo/Cath)   Social History     Substance and Sexual Activity   Alcohol Use Never     Social History     Tobacco Use   Smoking Status Never   Smokeless Tobacco Never     Social History     Substance and Sexual Activity   Drug Use Never    No results found for this or any previous visit.    No results found for this or any previous visit.    No results found for this or any previous visit.     Labs: Imaging:   Labs Reviewed   CBC AND DIFFERENTIAL - Abnormal       Result Value Ref Range Status    WBC 4.51  4.31 - 10.16 Thousand/uL Final    RBC 4.76  3.81 - 5.12 Million/uL Final    Hemoglobin 14.2  11.5 - 15.4 g/dL Final    Hematocrit 44.0  34.8 - 46.1 % Final     MCV 92  82 - 98 fL Final    MCH 29.8  26.8 - 34.3 pg Final    MCHC 32.3  31.4 - 37.4 g/dL Final    RDW 12.5  11.6 - 15.1 % Final    MPV 9.9  8.9 - 12.7 fL Final    Platelets 238  149 - 390 Thousands/uL Final    nRBC 0  /100 WBCs Final    Segmented % 44  43 - 75 % Final    Immature Grans % 0  0 - 2 % Final    Lymphocytes % 46 (*) 14 - 44 % Final    Monocytes % 9  4 - 12 % Final    Eosinophils Relative 1  0 - 6 % Final    Basophils Relative 0  0 - 1 % Final    Absolute Neutrophils 1.97  1.85 - 7.62 Thousands/µL Final    Absolute Immature Grans 0.01  0.00 - 0.20 Thousand/uL Final    Absolute Lymphocytes 2.07  0.60 - 4.47 Thousands/µL Final    Absolute Monocytes 0.41  0.17 - 1.22 Thousand/µL Final    Eosinophils Absolute 0.04  0.00 - 0.61 Thousand/µL Final    Basophils Absolute 0.01  0.00 - 0.10 Thousands/µL Final   COMPREHENSIVE METABOLIC PANEL - Abnormal    Sodium 138  135 - 147 mmol/L Final    Potassium 4.2  3.5 - 5.3 mmol/L Final    Chloride 104  96 - 108 mmol/L Final    CO2 31  21 - 32 mmol/L Final    ANION GAP 3 (*) 4 - 13 mmol/L Final    BUN 13  5 - 25 mg/dL Final    Creatinine 0.83  0.60 - 1.30 mg/dL Final    Comment: Standardized to IDMS reference method    Glucose 90  65 - 140 mg/dL Final    Comment: If the patient is fasting, the ADA then defines impaired fasting glucose as > 100 mg/dL and diabetes as > or equal to 123 mg/dL.    Calcium 9.3  8.4 - 10.2 mg/dL Final    AST 23  13 - 39 U/L Final    ALT 14  7 - 52 U/L Final    Comment: Specimen collection should occur prior to Sulfasalazine administration due to the potential for falsely depressed results.     Alkaline Phosphatase 69  34 - 104 U/L Final    Total Protein 7.1  6.4 - 8.4 g/dL Final    Albumin 3.7  3.5 - 5.0 g/dL Final    Total Bilirubin 0.79  0.20 - 1.00 mg/dL Final    Comment: Use of this assay is not recommended for patients undergoing treatment with eltrombopag due to the potential for falsely elevated results.  N-acetyl-p-benzoquinone imine  "(metabolite of Acetaminophen) will generate erroneously low results in samples for patients that have taken an overdose of Acetaminophen.    eGFR 67  ml/min/1.73sq m Final    Narrative:     National Kidney Disease Foundation guidelines for Chronic Kidney Disease (CKD):     Stage 1 with normal or high GFR (GFR > 90 mL/min/1.73 square meters)    Stage 2 Mild CKD (GFR = 60-89 mL/min/1.73 square meters)    Stage 3A Moderate CKD (GFR = 45-59 mL/min/1.73 square meters)    Stage 3B Moderate CKD (GFR = 30-44 mL/min/1.73 square meters)    Stage 4 Severe CKD (GFR = 15-29 mL/min/1.73 square meters)    Stage 5 End Stage CKD (GFR <15 mL/min/1.73 square meters)  Note: GFR calculation is accurate only with a steady state creatinine   HS TROPONIN I 0HR - Normal    hs TnI 0hr 3  \"Refer to ACS Flowchart\"- see link ng/L Final    Comment:                                              Initial (time 0) result  If >=50 ng/L, Myocardial injury suggested ;  Type of myocardial injury and treatment strategy  to be determined.  If 5-49 ng/L, a delta result at 2 hours and or 4 hours will be needed to further evaluate.  If <4 ng/L, and chest pain has been >3 hours since onset, patient may qualify for discharge based on the HEART score in the ED.  If <5 ng/L and <3hours since onset of chest pain, a delta result at 2 hours will be needed to further evaluate.    HS Troponin 99th Percentile URL of a Health Population=12 ng/L with a 95% Confidence Interval of 8-18 ng/L.    Second Troponin (time 2 hours)  If calculated delta >= 20 ng/L,  Myocardial injury suggested ; Type of myocardial injury and treatment strategy to be determined.  If 5-49 ng/L and the calculated delta is 5-19 ng/L, consult medical service for evaluation.  Continue evaluation for ischemia on ecg and other possible etiology and repeat hs troponin at 4 hours.  If delta is <5 ng/L at 2 hours, consider discharge based on risk stratification via the HEART score (if in ED), or GERRI risk " "score in IP/Observation.    HS Troponin 99th Percentile URL of a Health Population=12 ng/L with a 95% Confidence Interval of 8-18 ng/L.    XR chest 1 view portable    (Results Pending)   XR chest 2 views    (Results Pending)      Medications: Code Status:   Medications   acetaminophen (TYLENOL) tablet 975 mg (has no administration in time range)   ibuprofen (MOTRIN) tablet 400 mg (has no administration in time range)   lidocaine (LIDODERM) 5 % patch 1 patch (has no administration in time range)    Code Status: No Order  Advance Directive and Living Will:      Power of :    POLST:       Orders Placed This Encounter   Procedures    XR chest 1 view portable    XR chest 2 views    CBC and differential    Comprehensive metabolic panel    HS Troponin 0hr (reflex protocol)    Notify physician of an increase in chest pain, symptomatic hypotension, a change in cardiac rhythm, or an O2 saturation of less than 90%.    Notify physician immediately if patient has persistent Chest Pain.    Insert peripheral IV    Continuous cardiac monitoring    Continuous pulse oximetry    ECG 12 lead    ECG 12 lead     ED Disposition       None          Follow-up Information    None       Patient's Medications    No medications on file     No discharge procedures on file.  None                        Portions of the record may have been created with voice recognition software. Occasional wrong word or \"sound a like\" substitutions may have occurred due to the inherent limitations of voice recognition software. Read the chart carefully and recognize, using context, where substitutions have occurred.    Electronically signed by:  Irineo Sahu  " "0 Filed at: 11/07/2024 1629   ECG 1 Filed at: 11/07/2024 1629   Age 2 Filed at: 11/07/2024 1629   Risk Factors 1 Filed at: 11/07/2024 1629   Troponin 0 Filed at: 11/07/2024 1629   HEART Score 4 Filed at: 11/07/2024 1629                           Portions of the record may have been created with voice recognition software. Occasional wrong word or \"sound a like\" substitutions may have occurred due to the inherent limitations of voice recognition software. Read the chart carefully and recognize, using context, where substitutions have occurred.    Electronically signed by:  Irineo Sahu  "

## 2024-11-27 ENCOUNTER — HOSPITAL ENCOUNTER (EMERGENCY)
Facility: HOSPITAL | Age: 79
Discharge: HOME | End: 2024-11-27
Attending: EMERGENCY MEDICINE
Payer: COMMERCIAL

## 2024-11-27 ENCOUNTER — APPOINTMENT (EMERGENCY)
Dept: RADIOLOGY | Facility: HOSPITAL | Age: 79
End: 2024-11-27
Payer: COMMERCIAL

## 2024-11-27 VITALS
HEART RATE: 84 BPM | SYSTOLIC BLOOD PRESSURE: 130 MMHG | TEMPERATURE: 98.1 F | OXYGEN SATURATION: 95 % | RESPIRATION RATE: 18 BRPM | DIASTOLIC BLOOD PRESSURE: 76 MMHG

## 2024-11-27 DIAGNOSIS — J45.901 ASTHMA EXACERBATION: Primary | ICD-10-CM

## 2024-11-27 LAB
ANION GAP SERPL CALCULATED.3IONS-SCNC: 8 MMOL/L (ref 4–13)
ATRIAL RATE: 86 BPM
BASOPHILS # BLD AUTO: 0.02 THOUSANDS/ΜL (ref 0–0.1)
BASOPHILS NFR BLD AUTO: 0 % (ref 0–1)
BUN SERPL-MCNC: 20 MG/DL (ref 5–25)
CALCIUM SERPL-MCNC: 9.5 MG/DL (ref 8.4–10.2)
CARDIAC TROPONIN I PNL SERPL HS: 4 NG/L (ref ?–50)
CHLORIDE SERPL-SCNC: 96 MMOL/L (ref 96–108)
CO2 SERPL-SCNC: 34 MMOL/L (ref 21–32)
CREAT SERPL-MCNC: 1.19 MG/DL (ref 0.6–1.3)
EOSINOPHIL # BLD AUTO: 0 THOUSAND/ΜL (ref 0–0.61)
EOSINOPHIL NFR BLD AUTO: 0 % (ref 0–6)
ERYTHROCYTE [DISTWIDTH] IN BLOOD BY AUTOMATED COUNT: 12.3 % (ref 11.6–15.1)
GFR SERPL CREATININE-BSD FRML MDRD: 43 ML/MIN/1.73SQ M
GLUCOSE SERPL-MCNC: 98 MG/DL (ref 65–140)
HCT VFR BLD AUTO: 46.6 % (ref 34.8–46.1)
HGB BLD-MCNC: 15.1 G/DL (ref 11.5–15.4)
IMM GRANULOCYTES # BLD AUTO: 0.02 THOUSAND/UL (ref 0–0.2)
IMM GRANULOCYTES NFR BLD AUTO: 0 % (ref 0–2)
LYMPHOCYTES # BLD AUTO: 2.46 THOUSANDS/ΜL (ref 0.6–4.47)
LYMPHOCYTES NFR BLD AUTO: 48 % (ref 14–44)
MCH RBC QN AUTO: 29.6 PG (ref 26.8–34.3)
MCHC RBC AUTO-ENTMCNC: 32.4 G/DL (ref 31.4–37.4)
MCV RBC AUTO: 91 FL (ref 82–98)
MONOCYTES # BLD AUTO: 0.84 THOUSAND/ΜL (ref 0.17–1.22)
MONOCYTES NFR BLD AUTO: 16 % (ref 4–12)
NEUTROPHILS # BLD AUTO: 1.91 THOUSANDS/ΜL (ref 1.85–7.62)
NEUTS SEG NFR BLD AUTO: 36 % (ref 43–75)
NRBC BLD AUTO-RTO: 0 /100 WBCS
P AXIS: 45 DEGREES
PLATELET # BLD AUTO: 254 THOUSANDS/UL (ref 149–390)
PMV BLD AUTO: 10.1 FL (ref 8.9–12.7)
POTASSIUM SERPL-SCNC: 4.5 MMOL/L (ref 3.5–5.3)
PR INTERVAL: 154 MS
QRS AXIS: 24 DEGREES
QRSD INTERVAL: 84 MS
QT INTERVAL: 366 MS
QTC INTERVAL: 438 MS
RBC # BLD AUTO: 5.1 MILLION/UL (ref 3.81–5.12)
SODIUM SERPL-SCNC: 138 MMOL/L (ref 135–147)
T WAVE AXIS: 29 DEGREES
VENTRICULAR RATE: 86 BPM
WBC # BLD AUTO: 5.25 THOUSAND/UL (ref 4.31–10.16)

## 2024-11-27 PROCEDURE — 71046 X-RAY EXAM CHEST 2 VIEWS: CPT

## 2024-11-27 PROCEDURE — 80048 BASIC METABOLIC PNL TOTAL CA: CPT

## 2024-11-27 PROCEDURE — 94640 AIRWAY INHALATION TREATMENT: CPT

## 2024-11-27 PROCEDURE — 84484 ASSAY OF TROPONIN QUANT: CPT

## 2024-11-27 PROCEDURE — 93005 ELECTROCARDIOGRAM TRACING: CPT

## 2024-11-27 PROCEDURE — 99284 EMERGENCY DEPT VISIT MOD MDM: CPT | Performed by: EMERGENCY MEDICINE

## 2024-11-27 PROCEDURE — 85025 COMPLETE CBC W/AUTO DIFF WBC: CPT

## 2024-11-27 PROCEDURE — 99285 EMERGENCY DEPT VISIT HI MDM: CPT

## 2024-11-27 PROCEDURE — 93010 ELECTROCARDIOGRAM REPORT: CPT | Performed by: INTERNAL MEDICINE

## 2024-11-27 PROCEDURE — 36415 COLL VENOUS BLD VENIPUNCTURE: CPT

## 2024-11-27 RX ORDER — ALBUTEROL SULFATE 0.83 MG/ML
5 SOLUTION RESPIRATORY (INHALATION) ONCE
Status: COMPLETED | OUTPATIENT
Start: 2024-11-27 | End: 2024-11-27

## 2024-11-27 RX ORDER — PREDNISONE 20 MG/1
40 TABLET ORAL ONCE
Status: COMPLETED | OUTPATIENT
Start: 2024-11-27 | End: 2024-11-27

## 2024-11-27 RX ORDER — PREDNISONE 20 MG/1
40 TABLET ORAL DAILY
Qty: 10 TABLET | Refills: 0 | Status: SHIPPED | OUTPATIENT
Start: 2024-11-27 | End: 2024-12-02

## 2024-11-27 RX ADMIN — PREDNISONE 40 MG: 20 TABLET ORAL at 11:58

## 2024-11-27 RX ADMIN — IPRATROPIUM BROMIDE 0.5 MG: 0.5 SOLUTION RESPIRATORY (INHALATION) at 11:58

## 2024-11-27 RX ADMIN — ALBUTEROL SULFATE 5 MG: 2.5 SOLUTION RESPIRATORY (INHALATION) at 11:58

## 2024-11-27 NOTE — DISCHARGE INSTRUCTIONS
You are seen in the emergency department for symptoms consistent with an asthma exacerbation.  Your lab work and chest x-ray did not show any concerning findings.  A prescription for prednisone was sent to your pharmacy.  Continue using your asthma medications as prescribed.  If you have worsening symptoms or any new concerning symptoms please return to the emergency department.

## 2024-11-27 NOTE — ED ATTENDING ATTESTATION
11/27/2024  I, Tung Mckay DO, saw and evaluated the patient. I have discussed the patient with the resident/non-physician practitioner and agree with the resident's/non-physician practitioner's findings, Plan of Care, and MDM as documented in the resident's/non-physician practitioner's note, except where noted. All available labs and Radiology studies were reviewed.  I was present for key portions of any procedure(s) performed by the resident/non-physician practitioner and I was immediately available to provide assistance.       At this point I agree with the current assessment done in the Emergency Department.  I have conducted an independent evaluation of this patient a history and physical is as follows:    Patient is a 79-year-old female with history of asthma, hypertension, COPD, borderline heart failure, predominantly Slovak-speaking, accompanied by her bilingual daughter, who indicates she preferred to have the daughter as a  rather than using a  service.  For the last 3 days has been feeling she is having an asthma exacerbation with some wheezing, dry nonproductive cough.  Says her chest did hurt today after coughing very hard but does not hurt any other time.  She has no orthopnea, no fever, no chills, no travel history, no sick contacts.  No recent hospitalization or antibiotics.  No recent prolonged travel or immobilizations, no personal or family history of DVT or PE.  Last asthma exacerbation was about 4 to 5 months ago which was treated with a short course of oral steroids.  Her daughter indicates the patient is otherwise been acting normally, she has not seen any mental status changes.    General:  Patient is well-appearing  Head:  Atraumatic  Eyes:  Conjunctiva pink  ENT:  Mucous membranes are moist  Neck:  Supple  Cardiac:  S1-S2, without murmurs  Lungs: Diffuse expiratory wheezing, no rales, no rhonchi, no clinical respiratory failure or retractions or accessory  muscle usage  Abdomen:  Soft, nontender, normal bowel sounds, no CVA tenderness, no tympany, no rigidity, no guarding  Extremities:  Normal range of motion, no pedal edema or calf asymmetry, radial pulses are equal and symmetric bilaterally  Neurologic:  Awake, fluent speech, normal comprehension, AAOx3  Skin:  Pink warm and dry  Psychiatric:  Alert, pleasant, cooperative      ED Course  ED Course as of 11/27/24 1148   Wed Nov 27, 2024   1147 ECG interpreted by me, sinus rhythm, rate of 86, no acute ischemic or infarctive changes, no acute change from November 7, 2024     XR chest 2 views   ED Interpretation   Chest x-ray interpreted me shows a chronically elevated right hemidiaphragm, no infiltrate, no acute cardiopulmonary disease, no change from November 7, 2024      Final Result      No acute cardiopulmonary disease.            Workstation performed: [id number]           Labs Reviewed   CBC AND DIFFERENTIAL - Abnormal       Result Value Ref Range Status    WBC 5.25  4.31 - 10.16 Thousand/uL Final    RBC 5.10  3.81 - 5.12 Million/uL Final    Hemoglobin 15.1  11.5 - 15.4 g/dL Final    Hematocrit 46.6 (*) 34.8 - 46.1 % Final    MCV 91  82 - 98 fL Final    MCH 29.6  26.8 - 34.3 pg Final    MCHC 32.4  31.4 - 37.4 g/dL Final    RDW 12.3  11.6 - 15.1 % Final    MPV 10.1  8.9 - 12.7 fL Final    Platelets 254  149 - 390 Thousands/uL Final    nRBC 0  /100 WBCs Final    Segmented % 36 (*) 43 - 75 % Final    Immature Grans % 0  0 - 2 % Final    Lymphocytes % 48 (*) 14 - 44 % Final    Monocytes % 16 (*) 4 - 12 % Final    Eosinophils Relative 0  0 - 6 % Final    Basophils Relative 0  0 - 1 % Final    Absolute Neutrophils 1.91  1.85 - 7.62 Thousands/µL Final    Absolute Immature Grans 0.02  0.00 - 0.20 Thousand/uL Final    Absolute Lymphocytes 2.46  0.60 - 4.47 Thousands/µL Final    Absolute Monocytes 0.84  0.17 - 1.22 Thousand/µL Final    Eosinophils Absolute 0.00  0.00 - 0.61 Thousand/µL Final    Basophils Absolute 0.02   "0.00 - 0.10 Thousands/µL Final   BASIC METABOLIC PANEL - Abnormal    Sodium 138  135 - 147 mmol/L Final    Potassium 4.5  3.5 - 5.3 mmol/L Final    Chloride 96  96 - 108 mmol/L Final    CO2 34 (*) 21 - 32 mmol/L Final    ANION GAP 8  4 - 13 mmol/L Final    BUN 20  5 - 25 mg/dL Final    Creatinine 1.19  0.60 - 1.30 mg/dL Final    Comment: Standardized to IDMS reference method    Glucose 98  65 - 140 mg/dL Final    Comment: If the patient is fasting, the ADA then defines impaired fasting glucose as > 100 mg/dL and diabetes as > or equal to 123 mg/dL.    Calcium 9.5  8.4 - 10.2 mg/dL Final    eGFR 43  ml/min/1.73sq m Final    Narrative:     National Kidney Disease Foundation guidelines for Chronic Kidney Disease (CKD):     Stage 1 with normal or high GFR (GFR > 90 mL/min/1.73 square meters)    Stage 2 Mild CKD (GFR = 60-89 mL/min/1.73 square meters)    Stage 3A Moderate CKD (GFR = 45-59 mL/min/1.73 square meters)    Stage 3B Moderate CKD (GFR = 30-44 mL/min/1.73 square meters)    Stage 4 Severe CKD (GFR = 15-29 mL/min/1.73 square meters)    Stage 5 End Stage CKD (GFR <15 mL/min/1.73 square meters)  Note: GFR calculation is accurate only with a steady state creatinine   HS TROPONIN I 0HR - Normal    hs TnI 0hr 4  \"Refer to ACS Flowchart\"- see link ng/L Final    Comment:                                              Initial (time 0) result  If >=50 ng/L, Myocardial injury suggested ;  Type of myocardial injury and treatment strategy  to be determined.  If 5-49 ng/L, a delta result at 2 hours and or 4 hours will be needed to further evaluate.  If <4 ng/L, and chest pain has been >3 hours since onset, patient may qualify for discharge based on the HEART score in the ED.  If <5 ng/L and <3hours since onset of chest pain, a delta result at 2 hours will be needed to further evaluate.    HS Troponin 99th Percentile URL of a Health Population=12 ng/L with a 95% Confidence Interval of 8-18 ng/L.    Second Troponin (time 2 " hours)  If calculated delta >= 20 ng/L,  Myocardial injury suggested ; Type of myocardial injury and treatment strategy to be determined.  If 5-49 ng/L and the calculated delta is 5-19 ng/L, consult medical service for evaluation.  Continue evaluation for ischemia on ecg and other possible etiology and repeat hs troponin at 4 hours.  If delta is <5 ng/L at 2 hours, consider discharge based on risk stratification via the HEART score (if in ED), or GERRI risk score in IP/Observation.    HS Troponin 99th Percentile URL of a Health Population=12 ng/L with a 95% Confidence Interval of 8-18 ng/L.     On reassessment after breathing treatment, patient was feeling better.  At this point I suspect the patient is having an asthma exacerbation.  No sign of acute coronary syndrome, no sign of pneumonia, do not believe this is congestive heart failure.  Prescribed prednisone, PCP follow-up supportive care, importance of follow-up and return precautions were discussed with patient and daughter, who expressed understanding.      DIAGNOSIS:  Acute asthma exacerbation    MEDICAL DECISION MAKING CODING    COLLECTION AND INTERPRETATION OF DATA  I reviewed prior external notes, including November 7, 2024 ECG    I ordered each unique test  Tests reviewed personally by me:  ECG: See my ED course  Labs: See above  Imaging: I independently interpreted the chest x-ray as noted above.            Critical Care Time  Procedures

## 2024-11-27 NOTE — ED PROVIDER NOTES
Time reflects when diagnosis was documented in both MDM as applicable and the Disposition within this note       Time User Action Codes Description Comment    11/27/2024  1:02 PM Mendez Guzman Add [J45.901] Asthma exacerbation           ED Disposition       ED Disposition   Discharge    Condition   Stable    Date/Time   Wed Nov 27, 2024  1:03 PM    Comment   Vikki Kim discharge to home/self care.                   Assessment & Plan       Medical Decision Making  79-year-old female with a history of hypertension, asthma, CHF, and cirrhosis who presents for evaluation of shortness of breath that has been ongoing for the past 3 days.  Vitals are within the normal limits.  On exam the patient has diffuse expiratory wheezes.  The main to the patient's exam is unremarkable.  Differential diagnosis includes asthma exacerbation, CHF exacerbation, ACS, pneumonia, viral infection.  Will order EKG, troponin, CBC, CMP, and chest x-ray.  Will treat the patient with prednisone and nebulizer treatment.    EKG rate 86, sinus rhythm, normal axis, normal intervals, no ST elevation or depression.  Troponin is 4.  The remainder the patient's lab work is reviewed without actionable derangements.  Chest x-ray shows no acute cardiopulmonary disease on my interpretation.  Patient reports some improvement of her symptoms after treatment.  The patient states that she would like to be discharged.  A prescription for prednisone is sent to the patient's pharmacy.  Return precautions are given and the patient is discharged.    Amount and/or Complexity of Data Reviewed  Labs: ordered.  Radiology: ordered and independent interpretation performed.    Risk  Prescription drug management.             Medications   predniSONE tablet 40 mg (40 mg Oral Given 11/27/24 1158)   albuterol inhalation solution 5 mg (5 mg Nebulization Given 11/27/24 1158)     And   ipratropium (ATROVENT) 0.02 % inhalation solution 0.5 mg (0.5 mg Nebulization Given  11/27/24 1158)       ED Risk Strat Scores                           SBIRT 22yo+      Flowsheet Row Most Recent Value   Initial Alcohol Screen: US AUDIT-C     1. How often do you have a drink containing alcohol? 0 Filed at: 11/27/2024 1135   2. How many drinks containing alcohol do you have on a typical day you are drinking?  0 Filed at: 11/27/2024 1135   3a. Male UNDER 65: How often do you have five or more drinks on one occasion? 0 Filed at: 11/27/2024 1135   3b. FEMALE Any Age, or MALE 65+: How often do you have 4 or more drinks on one occassion? 0 Filed at: 11/27/2024 1135   Audit-C Score 0 Filed at: 11/27/2024 1135   PAULETTE: How many times in the past year have you...    Used an illegal drug or used a prescription medication for non-medical reasons? Never Filed at: 11/27/2024 1135                            History of Present Illness       Chief Complaint   Patient presents with    Shortness of Breath     Pt reports sob for the past three days.  Pt denies cp.       Past Medical History:   Diagnosis Date    Asthma     COPD (chronic obstructive pulmonary disease) (HCC)     Hypertension       Past Surgical History:   Procedure Laterality Date    HYSTERECTOMY      TONSILLECTOMY      US GUIDED LIVER BIOPSY  09/27/2013      History reviewed. No pertinent family history.   Social History     Tobacco Use    Smoking status: Never    Smokeless tobacco: Never   Substance Use Topics    Alcohol use: Never    Drug use: Never      E-Cigarette/Vaping      E-Cigarette/Vaping Substances      I have reviewed and agree with the history as documented.     79-year-old female with a history of hypertension, asthma, CHF, and cirrhosis who presents for evaluation of shortness of breath that has been ongoing for the past 3 days.  The patient reports a nonproductive cough over the same time.  The patient reports that her symptoms feel similar to prior asthma exacerbations.  The patient has been using her albuterol nebulizer without  significant relief of her symptoms.  The patient denies fever, chills, chest pain, palpitations, nausea, vomiting, diarrhea, abdominal pain, pain or swelling in her lower extremities.        Review of Systems   Constitutional:  Negative for chills, diaphoresis and fever.   HENT:  Negative for congestion and sore throat.    Eyes:  Negative for pain and redness.   Respiratory:  Positive for cough, shortness of breath and wheezing.    Cardiovascular:  Negative for chest pain, palpitations and leg swelling.   Gastrointestinal:  Negative for abdominal pain, diarrhea, nausea and vomiting.   Genitourinary:  Negative for dysuria, flank pain and hematuria.   Musculoskeletal:  Negative for arthralgias and myalgias.   Skin:  Negative for color change, pallor and rash.   Neurological:  Negative for dizziness, syncope, weakness, light-headedness, numbness and headaches.   All other systems reviewed and are negative.          Objective       ED Triage Vitals [11/27/24 1107]   Temperature Pulse Blood Pressure Respirations SpO2 Patient Position - Orthostatic VS   98.1 °F (36.7 °C) 90 130/80 20 92 % --      Temp src Heart Rate Source BP Location FiO2 (%) Pain Score    -- Monitor -- -- --      Vitals      Date and Time Temp Pulse SpO2 Resp BP Pain Score FACES Pain Rating User   11/27/24 1139 -- 84 95 % 18 130/76 -- -- KM   11/27/24 1107 98.1 °F (36.7 °C) 90 92 % 20 130/80 -- -- LML            Physical Exam  Vitals and nursing note reviewed.   Constitutional:       General: She is not in acute distress.     Appearance: Normal appearance. She is not ill-appearing, toxic-appearing or diaphoretic.   HENT:      Head: Normocephalic and atraumatic.      Nose: Nose normal. No congestion or rhinorrhea.      Mouth/Throat:      Mouth: Mucous membranes are moist.      Pharynx: Oropharynx is clear.   Eyes:      General: No scleral icterus.     Extraocular Movements: Extraocular movements intact.      Conjunctiva/sclera: Conjunctivae normal.       Pupils: Pupils are equal, round, and reactive to light.   Cardiovascular:      Rate and Rhythm: Normal rate and regular rhythm.      Pulses: Normal pulses.      Heart sounds: Normal heart sounds. No murmur heard.     No friction rub. No gallop.   Pulmonary:      Effort: Pulmonary effort is normal. No respiratory distress.      Breath sounds: Wheezing present. No rhonchi or rales.   Abdominal:      General: Abdomen is flat.      Palpations: Abdomen is soft.      Tenderness: There is no abdominal tenderness. There is no right CVA tenderness, left CVA tenderness, guarding or rebound.   Musculoskeletal:         General: No swelling, tenderness, deformity or signs of injury. Normal range of motion.      Cervical back: Normal range of motion and neck supple. No rigidity or tenderness.      Right lower leg: No edema.      Left lower leg: No edema.   Lymphadenopathy:      Cervical: No cervical adenopathy.   Skin:     General: Skin is warm and dry.      Capillary Refill: Capillary refill takes less than 2 seconds.      Coloration: Skin is not jaundiced or pale.      Findings: No bruising, erythema, lesion or rash.   Neurological:      General: No focal deficit present.      Mental Status: She is alert and oriented to person, place, and time.      Sensory: No sensory deficit.      Motor: No weakness.         Results Reviewed       Procedure Component Value Units Date/Time    HS Troponin 0hr (reflex protocol) [[id number]]  (Normal) Collected: 11/27/24 1204    Lab Status: Final result Specimen: Blood from Arm, Right Updated: 11/27/24 1302     hs TnI 0hr 4 ng/L     Basic metabolic panel [[id number]]  (Abnormal) Collected: 11/27/24 1204    Lab Status: Final result Specimen: Blood from Arm, Right Updated: 11/27/24 1236     Sodium 138 mmol/L      Potassium 4.5 mmol/L      Chloride 96 mmol/L      CO2 34 mmol/L      ANION GAP 8 mmol/L      BUN 20 mg/dL      Creatinine 1.19 mg/dL      Glucose 98 mg/dL      Calcium 9.5 mg/dL      eGFR 43  ml/min/1.73sq m     Narrative:      National Kidney Disease Foundation guidelines for Chronic Kidney Disease (CKD):     Stage 1 with normal or high GFR (GFR > 90 mL/min/1.73 square meters)    Stage 2 Mild CKD (GFR = 60-89 mL/min/1.73 square meters)    Stage 3A Moderate CKD (GFR = 45-59 mL/min/1.73 square meters)    Stage 3B Moderate CKD (GFR = 30-44 mL/min/1.73 square meters)    Stage 4 Severe CKD (GFR = 15-29 mL/min/1.73 square meters)    Stage 5 End Stage CKD (GFR <15 mL/min/1.73 square meters)  Note: GFR calculation is accurate only with a steady state creatinine    CBC and differential [[id number]]  (Abnormal) Collected: 11/27/24 1204    Lab Status: Final result Specimen: Blood from Arm, Right Updated: 11/27/24 1220     WBC 5.25 Thousand/uL      RBC 5.10 Million/uL      Hemoglobin 15.1 g/dL      Hematocrit 46.6 %      MCV 91 fL      MCH 29.6 pg      MCHC 32.4 g/dL      RDW 12.3 %      MPV 10.1 fL      Platelets 254 Thousands/uL      nRBC 0 /100 WBCs      Segmented % 36 %      Immature Grans % 0 %      Lymphocytes % 48 %      Monocytes % 16 %      Eosinophils Relative 0 %      Basophils Relative 0 %      Absolute Neutrophils 1.91 Thousands/µL      Absolute Immature Grans 0.02 Thousand/uL      Absolute Lymphocytes 2.46 Thousands/µL      Absolute Monocytes 0.84 Thousand/µL      Eosinophils Absolute 0.00 Thousand/µL      Basophils Absolute 0.02 Thousands/µL             XR chest 2 views   ED Interpretation by Tung Mckay DO (11/27 1214)   Chest x-ray interpreted me shows a chronically elevated right hemidiaphragm, no infiltrate, no acute cardiopulmonary disease, no change from November 7, 2024      Final Interpretation by Missy Page MD (11/27 1304)      No acute cardiopulmonary disease.            Workstation performed: [id number]             Procedures    ED Medication and Procedure Management   None     Discharge Medication List as of 11/27/2024  1:17 PM        START taking these medications     Details   predniSONE 20 mg tablet Take 2 tablets (40 mg total) by mouth daily for 5 days, Starting Wed 11/27/2024, Until Mon 12/2/2024, Normal           No discharge procedures on file.  ED SEPSIS DOCUMENTATION   Time reflects when diagnosis was documented in both MDM as applicable and the Disposition within this note       Time User Action Codes Description Comment    11/27/2024  1:02 PM Mendez Guzman Add [J45.901] Asthma exacerbation                  Mendez Guzman,   11/28/24 0705

## 2024-12-13 ENCOUNTER — OFFICE VISIT (OUTPATIENT)
Dept: FAMILY MEDICINE CLINIC | Facility: CLINIC | Age: 79
End: 2024-12-13

## 2024-12-13 VITALS
OXYGEN SATURATION: 97 % | SYSTOLIC BLOOD PRESSURE: 132 MMHG | DIASTOLIC BLOOD PRESSURE: 82 MMHG | WEIGHT: 191.4 LBS | TEMPERATURE: 97.5 F | BODY MASS INDEX: 33.91 KG/M2 | RESPIRATION RATE: 17 BRPM | HEIGHT: 63 IN | HEART RATE: 70 BPM

## 2024-12-13 DIAGNOSIS — I50.32 CHRONIC DIASTOLIC HEART FAILURE (HCC): Primary | ICD-10-CM

## 2024-12-13 DIAGNOSIS — E78.2 MIXED HYPERLIPIDEMIA: ICD-10-CM

## 2024-12-13 DIAGNOSIS — E66.811 CLASS 1 OBESITY DUE TO EXCESS CALORIES WITHOUT SERIOUS COMORBIDITY WITH BODY MASS INDEX (BMI) OF 33.0 TO 33.9 IN ADULT: ICD-10-CM

## 2024-12-13 DIAGNOSIS — G47.33 OSA ON CPAP: ICD-10-CM

## 2024-12-13 DIAGNOSIS — E66.09 CLASS 1 OBESITY DUE TO EXCESS CALORIES WITHOUT SERIOUS COMORBIDITY WITH BODY MASS INDEX (BMI) OF 33.0 TO 33.9 IN ADULT: ICD-10-CM

## 2024-12-13 DIAGNOSIS — J44.9 CHRONIC OBSTRUCTIVE PULMONARY DISEASE, UNSPECIFIED COPD TYPE (HCC): ICD-10-CM

## 2024-12-13 DIAGNOSIS — Z76.89 ENCOUNTER TO ESTABLISH CARE: ICD-10-CM

## 2024-12-13 DIAGNOSIS — J45.40 MODERATE PERSISTENT ASTHMA, UNSPECIFIED WHETHER COMPLICATED: ICD-10-CM

## 2024-12-13 DIAGNOSIS — E55.9 VITAMIN D DEFICIENCY: ICD-10-CM

## 2024-12-13 PROCEDURE — 99204 OFFICE O/P NEW MOD 45 MIN: CPT | Performed by: FAMILY MEDICINE

## 2024-12-13 NOTE — PROGRESS NOTES
Name: Vikki Kim      : 1945      MRN: [id number]  Encounter Provider: Loreta Abernathy MD  Encounter Date: 2024   Encounter department: [facility]    Assessment & Plan  Chronic diastolic heart failure (HCC)  Wt Readings from Last 3 Encounters:   24 86.8 kg (191 lb 6.4 oz)     Weight stable  Refer to Cardiology to establish  care in [place]  Orders:  •  Lipid panel; Future  •  Hemoglobin A1C; Future  •  Comprehensive metabolic panel; Future  •  Vitamin D 25 hydroxy; Future  •  Ambulatory Referral to Cardiology; Future    Vitamin D deficiency    Orders:  •  Vitamin D 25 hydroxy; Future    Class 1 obesity due to excess calories without serious comorbidity with body mass index (BMI) of 33.0 to 33.9 in adult  Body mass index is 33.9 kg/m².      Orders:  •  Lipid panel; Future    Mixed hyperlipidemia    Orders:  •  Lipid panel; Future    WILD on CPAP    Orders:  •  Ambulatory Referral to Sleep Medicine; Future    Chronic obstructive pulmonary disease, unspecified COPD type (HCC)         Moderate persistent asthma, unspecified whether complicated    Orders:  •  Ambulatory Referral to Pulmonology; Future    Encounter to establish care              History of Present Illness     HPI    Vikki Kim is a 79 y.o. female  who presented to the office today with her minerva Hidalgo.  Pateint is moving from [place] to [place] to live with her daughter.  Daughter is a medical docotor from [place], and brings in all of her mother's medical records today    Past Medical Hx: CHF, CAD, HLD, Asthma/COPD, Htn, Anxiety/Depression    The following portions of the patient's history were reviewed and updated as appropriate: allergies, current medications, past family history, past medical history, past social history, past surgical history and problem list.    Review of Systems   Constitutional:  Negative for chills and fever.   HENT:  Negative for  "congestion, rhinorrhea and sore throat.    Respiratory:  Negative for cough and shortness of breath.    Cardiovascular:  Negative for chest pain.   Gastrointestinal:  Negative for diarrhea, nausea and vomiting.   Skin:  Negative for rash.   Neurological:  Negative for dizziness and headaches.   Psychiatric/Behavioral:  Positive for sleep disturbance. The patient is nervous/anxious.      Past Medical History:   Diagnosis Date   • Asthma    • Chronic diastolic heart failure (HCC) 12/30/2024   • Chronic obstructive pulmonary disease (HCC) 12/30/2024   • Class 1 obesity due to excess calories without serious comorbidity with body mass index (BMI) of 33.0 to 33.9 in adult 12/30/2024   • COPD (chronic obstructive pulmonary disease) (HCC)    • Hypertension    • Mixed hyperlipidemia 12/30/2024   • Moderate persistent asthma 12/30/2024   • WILD on CPAP 12/30/2024   • Vitamin D deficiency 12/30/2024     Past Surgical History:   Procedure Laterality Date   • HYSTERECTOMY     • TONSILLECTOMY     • US GUIDED LIVER BIOPSY  09/27/2013     No family history on file.  Social History     Tobacco Use   • Smoking status: Never   • Smokeless tobacco: Never   Vaping Use   • Vaping status: Never Used   Substance and Sexual Activity   • Alcohol use: Never   • Drug use: Never   • Sexual activity: Not on file     No current outpatient medications on file prior to visit.     Allergies   Allergen Reactions   • Aspirin Shortness Of Breath       There is no immunization history on file for this patient.  Objective   /82 (BP Location: Right arm, Patient Position: Sitting, Cuff Size: Large)   Pulse 70   Temp 97.5 °F (36.4 °C) (Temporal)   Resp 17   Ht 5' 3\" (1.6 m)   Wt 86.8 kg (191 lb 6.4 oz)   SpO2 97%   BMI 33.90 kg/m²     Physical Exam  Vitals and nursing note reviewed.   Constitutional:       General: She is not in acute distress.     Appearance: She is well-developed. She is obese.   HENT:      Head: Normocephalic and atraumatic. "   Eyes:      Conjunctiva/sclera: Conjunctivae normal.   Cardiovascular:      Rate and Rhythm: Normal rate and regular rhythm.      Heart sounds: No murmur heard.  Pulmonary:      Effort: Pulmonary effort is normal. No respiratory distress.      Breath sounds: Normal breath sounds.   Abdominal:      Palpations: Abdomen is soft.      Tenderness: There is no abdominal tenderness.   Musculoskeletal:         General: No swelling.      Cervical back: Neck supple.      Right lower leg: No edema.      Left lower leg: No edema.   Skin:     General: Skin is warm and dry.      Capillary Refill: Capillary refill takes less than 2 seconds.   Neurological:      Mental Status: She is alert.   Psychiatric:         Mood and Affect: Mood normal.         Behavior: Behavior normal.

## 2024-12-30 PROBLEM — E55.9 VITAMIN D DEFICIENCY: Status: ACTIVE | Noted: 2024-12-30

## 2024-12-30 PROBLEM — E66.811 CLASS 1 OBESITY DUE TO EXCESS CALORIES WITHOUT SERIOUS COMORBIDITY WITH BODY MASS INDEX (BMI) OF 33.0 TO 33.9 IN ADULT: Status: ACTIVE | Noted: 2024-12-30

## 2024-12-30 PROBLEM — J45.40 MODERATE PERSISTENT ASTHMA: Status: ACTIVE | Noted: 2024-12-30

## 2024-12-30 PROBLEM — I50.32 CHRONIC DIASTOLIC HEART FAILURE (HCC): Status: ACTIVE | Noted: 2024-12-30

## 2024-12-30 PROBLEM — J44.9 CHRONIC OBSTRUCTIVE PULMONARY DISEASE (HCC): Status: ACTIVE | Noted: 2024-12-30

## 2024-12-30 PROBLEM — G47.33 OSA ON CPAP: Status: ACTIVE | Noted: 2024-12-30

## 2024-12-30 PROBLEM — E78.2 MIXED HYPERLIPIDEMIA: Status: ACTIVE | Noted: 2024-12-30

## 2024-12-30 PROBLEM — E66.09 CLASS 1 OBESITY DUE TO EXCESS CALORIES WITHOUT SERIOUS COMORBIDITY WITH BODY MASS INDEX (BMI) OF 33.0 TO 33.9 IN ADULT: Status: ACTIVE | Noted: 2024-12-30

## 2024-12-30 NOTE — ASSESSMENT & PLAN NOTE
Wt Readings from Last 3 Encounters:   12/13/24 86.8 kg (191 lb 6.4 oz)     Weight stable  Refer to Cardiology to establish  care in [place]  Orders:  •  Lipid panel; Future  •  Hemoglobin A1C; Future  •  Comprehensive metabolic panel; Future  •  Vitamin D 25 hydroxy; Future  •  Ambulatory Referral to Cardiology; Future

## 2025-02-20 ENCOUNTER — TELEPHONE (OUTPATIENT)
Dept: CARDIOLOGY CLINIC | Facility: CLINIC | Age: 80
End: 2025-02-20

## 2025-04-09 ENCOUNTER — DOCTOR'S OFFICE (OUTPATIENT)
Dept: URBAN - METROPOLITAN AREA CLINIC 125 | Facility: CLINIC | Age: 80
Setting detail: OPHTHALMOLOGY
End: 2025-04-09
Payer: COMMERCIAL

## 2025-04-09 VITALS — HEIGHT: 55 IN

## 2025-04-09 DIAGNOSIS — H53.123: ICD-10-CM

## 2025-04-09 DIAGNOSIS — H35.3132: ICD-10-CM

## 2025-04-09 PROCEDURE — 99213 OFFICE O/P EST LOW 20 MIN: CPT | Performed by: OPHTHALMOLOGY

## 2025-04-09 PROCEDURE — 92134 CPTRZ OPH DX IMG PST SGM RTA: CPT | Performed by: OPHTHALMOLOGY

## 2025-04-09 ASSESSMENT — CORNEAL DYSTROPHY
OD_DYSTROPHY: ARCUS SENILIS
OS_DYSTROPHY: ARCUS SENILIS

## 2025-04-09 ASSESSMENT — VISUAL ACUITY
OS_BCVA: 20/25+1
OD_BCVA: 20/40-1

## 2025-04-09 ASSESSMENT — KERATOMETRY
OS_K2POWER_DIOPTERS: 44.75
OD_AXISANGLE_DEGREES: 075
OD_K1POWER_DIOPTERS: 42.75
OD_K2POWER_DIOPTERS: 44.25
OS_K1POWER_DIOPTERS: 42.75
OS_AXISANGLE_DEGREES: 102

## 2025-04-09 ASSESSMENT — PUNCTA - ASSESSMENT
OS_PUNCTA: LLL SMALL
OD_PUNCTA: RLL SMALL

## 2025-04-09 ASSESSMENT — REFRACTION_MANIFEST
OS_AXIS: 100
OS_CYLINDER: -2.00
OS_ADD: +2.25
OS_SPHERE: +1.00
OD_AXIS: 080
OS_VA1: 20/25-
OD_SPHERE: +0.75
OD_ADD: +2.25
OD_CYLINDER: -1.50
OD_VA1: 20/25-

## 2025-04-09 ASSESSMENT — DRY EYES - PHYSICIAN NOTES
OS_GENERALCOMMENTS: +
OD_GENERALCOMMENTS: +

## 2025-04-09 ASSESSMENT — LID EXAM ASSESSMENTS: OS_BLEPHARITIS: T

## 2025-04-09 ASSESSMENT — CONFRONTATIONAL VISUAL FIELD TEST (CVF)
OD_FINDINGS: FULL
OS_FINDINGS: FULL

## 2025-04-09 ASSESSMENT — CORNEAL PTERYGIUM: OS_PTERYGIUM: 1MM

## 2025-04-09 ASSESSMENT — REFRACTION_AUTOREFRACTION
OD_CYLINDER: -2.25
OS_CYLINDER: -3.00
OS_SPHERE: +1.75
OS_AXIS: 101
OD_SPHERE: +1.50
OD_AXIS: 083

## 2025-04-15 ENCOUNTER — RX ONLY (RX ONLY)
Age: 80
End: 2025-04-15

## 2025-04-15 ENCOUNTER — DOCTOR'S OFFICE (OUTPATIENT)
Dept: URBAN - METROPOLITAN AREA CLINIC 125 | Facility: CLINIC | Age: 80
Setting detail: OPHTHALMOLOGY
End: 2025-04-15
Payer: COMMERCIAL

## 2025-04-15 DIAGNOSIS — H53.123: ICD-10-CM

## 2025-04-15 DIAGNOSIS — H35.3132: ICD-10-CM

## 2025-04-15 DIAGNOSIS — H16.223: ICD-10-CM

## 2025-04-15 PROBLEM — H11.002 PTERYGIUM; LEFT EYE: Status: ACTIVE | Noted: 2025-04-09

## 2025-04-15 PROBLEM — H35.3122 AGE RELATED MACULAR DEGENERATION DRY; RIGHT EYE INTERMEDIATE, LEFT EYE INTERMEDIATE: Status: ACTIVE | Noted: 2025-04-09

## 2025-04-15 PROBLEM — H35.3112 AGE RELATED MACULAR DEGENERATION DRY; RIGHT EYE INTERMEDIATE, LEFT EYE INTERMEDIATE: Status: ACTIVE | Noted: 2025-04-09

## 2025-04-15 PROCEDURE — 92235 FLUORESCEIN ANGRPH MLTIFRAME: CPT | Performed by: OPHTHALMOLOGY

## 2025-04-15 PROCEDURE — 92250 FUNDUS PHOTOGRAPHY W/I&R: CPT | Performed by: OPHTHALMOLOGY

## 2025-04-15 PROCEDURE — 99213 OFFICE O/P EST LOW 20 MIN: CPT | Performed by: OPHTHALMOLOGY

## 2025-04-15 ASSESSMENT — VISUAL ACUITY
OD_BCVA: 20/40-2
OS_BCVA: 20/30-1

## 2025-04-15 ASSESSMENT — DRY EYES - PHYSICIAN NOTES
OS_GENERALCOMMENTS: +
OD_GENERALCOMMENTS: +

## 2025-04-15 ASSESSMENT — REFRACTION_AUTOREFRACTION
OS_AXIS: 101
OD_CYLINDER: -2.25
OD_AXIS: 083
OS_SPHERE: +1.75
OS_CYLINDER: -3.00
OD_SPHERE: +1.50

## 2025-04-15 ASSESSMENT — REFRACTION_MANIFEST
OD_VA1: 20/25-
OS_VA1: 20/25-
OD_ADD: +2.25
OD_SPHERE: +0.75
OS_CYLINDER: -2.00
OS_SPHERE: +1.00
OS_ADD: +2.25
OS_AXIS: 100
OD_AXIS: 080
OD_CYLINDER: -1.50

## 2025-04-15 ASSESSMENT — KERATOMETRY
OD_AXISANGLE_DEGREES: 075
OD_K2POWER_DIOPTERS: 44.25
OS_K1POWER_DIOPTERS: 42.75
OD_K1POWER_DIOPTERS: 42.75
OS_AXISANGLE_DEGREES: 102
OS_K2POWER_DIOPTERS: 44.75

## 2025-04-15 ASSESSMENT — CONFRONTATIONAL VISUAL FIELD TEST (CVF)
OS_FINDINGS: FULL
OD_FINDINGS: FULL

## 2025-04-15 ASSESSMENT — PUNCTA - ASSESSMENT
OD_PUNCTA: RLL SMALL
OS_PUNCTA: LLL SMALL

## 2025-04-15 ASSESSMENT — CORNEAL DYSTROPHY
OS_DYSTROPHY: ARCUS SENILIS
OD_DYSTROPHY: ARCUS SENILIS

## 2025-04-15 ASSESSMENT — CORNEAL PTERYGIUM: OS_PTERYGIUM: 1MM

## 2025-04-15 ASSESSMENT — LID EXAM ASSESSMENTS: OS_BLEPHARITIS: T

## 2025-05-20 ENCOUNTER — DOCTOR'S OFFICE (OUTPATIENT)
Dept: URBAN - METROPOLITAN AREA CLINIC 125 | Facility: CLINIC | Age: 80
Setting detail: OPHTHALMOLOGY
End: 2025-05-20
Payer: COMMERCIAL

## 2025-05-20 DIAGNOSIS — H35.3132: ICD-10-CM

## 2025-05-20 DIAGNOSIS — H16.223: ICD-10-CM

## 2025-05-20 DIAGNOSIS — H53.123: ICD-10-CM

## 2025-05-20 PROCEDURE — 99213 OFFICE O/P EST LOW 20 MIN: CPT | Performed by: OPHTHALMOLOGY

## 2025-05-20 PROCEDURE — 92134 CPTRZ OPH DX IMG PST SGM RTA: CPT | Performed by: OPHTHALMOLOGY

## 2025-05-20 ASSESSMENT — VISUAL ACUITY
OD_BCVA: 20/50-1
OS_BCVA: 20/40-1

## 2025-05-20 ASSESSMENT — REFRACTION_AUTOREFRACTION
OD_AXIS: 083
OS_AXIS: 101
OS_SPHERE: +1.75
OS_CYLINDER: -3.00
OD_SPHERE: +1.50
OD_CYLINDER: -2.25

## 2025-05-20 ASSESSMENT — LID EXAM ASSESSMENTS: OS_BLEPHARITIS: T

## 2025-05-20 ASSESSMENT — REFRACTION_MANIFEST
OD_VA1: 20/25-
OS_CYLINDER: -2.00
OS_SPHERE: +1.00
OS_VA1: 20/25-
OD_SPHERE: +0.75
OD_AXIS: 080
OD_CYLINDER: -1.50
OS_AXIS: 100
OS_ADD: +2.25
OD_ADD: +2.25

## 2025-05-20 ASSESSMENT — PUNCTA - ASSESSMENT
OD_PUNCTA: RLL SMALL
OS_PUNCTA: LLL SMALL

## 2025-05-20 ASSESSMENT — KERATOMETRY
OS_K2POWER_DIOPTERS: 44.75
OS_K1POWER_DIOPTERS: 42.75
OS_AXISANGLE_DEGREES: 102
OD_AXISANGLE_DEGREES: 075
OD_K2POWER_DIOPTERS: 44.25
OD_K1POWER_DIOPTERS: 42.75

## 2025-05-20 ASSESSMENT — CONFRONTATIONAL VISUAL FIELD TEST (CVF)
OS_FINDINGS: FULL
OD_FINDINGS: FULL

## 2025-05-20 ASSESSMENT — DRY EYES - PHYSICIAN NOTES
OD_GENERALCOMMENTS: +
OS_GENERALCOMMENTS: +

## 2025-05-20 ASSESSMENT — CORNEAL DYSTROPHY
OD_DYSTROPHY: ARCUS SENILIS
OS_DYSTROPHY: ARCUS SENILIS

## 2025-05-20 ASSESSMENT — CORNEAL PTERYGIUM: OS_PTERYGIUM: 1MM

## 2025-05-23 ENCOUNTER — DOCTOR'S OFFICE (OUTPATIENT)
Dept: URBAN - METROPOLITAN AREA CLINIC 125 | Facility: CLINIC | Age: 80
Setting detail: OPHTHALMOLOGY
End: 2025-05-23
Payer: COMMERCIAL

## 2025-05-23 DIAGNOSIS — H16.222: ICD-10-CM

## 2025-05-23 DIAGNOSIS — H16.221: ICD-10-CM

## 2025-05-23 PROCEDURE — 68761 CLOSE TEAR DUCT OPENING: CPT | Mod: LT | Performed by: OPHTHALMOLOGY

## 2025-05-23 PROCEDURE — 68761 CLOSE TEAR DUCT OPENING: CPT | Mod: RT | Performed by: OPHTHALMOLOGY

## 2025-05-27 ASSESSMENT — KERATOMETRY
OS_K1POWER_DIOPTERS: 42.75
OS_K2POWER_DIOPTERS: 44.75
OS_AXISANGLE_DEGREES: 102
OD_K2POWER_DIOPTERS: 44.25
OD_AXISANGLE_DEGREES: 075
OD_K1POWER_DIOPTERS: 42.75

## 2025-05-27 ASSESSMENT — REFRACTION_AUTOREFRACTION
OD_CYLINDER: -2.25
OD_AXIS: 083
OD_SPHERE: +1.50
OS_CYLINDER: -3.00
OS_AXIS: 101
OS_SPHERE: +1.75

## 2025-05-27 ASSESSMENT — REFRACTION_MANIFEST
OD_ADD: +2.25
OD_AXIS: 080
OS_ADD: +2.25
OD_CYLINDER: -1.50
OS_CYLINDER: -2.00
OD_VA1: 20/25-
OS_VA1: 20/25-
OS_AXIS: 100
OD_SPHERE: +0.75
OS_SPHERE: +1.00

## 2025-05-27 ASSESSMENT — VISUAL ACUITY
OS_BCVA: 20/40-1
OD_BCVA: 20/50-1

## 2025-06-26 ENCOUNTER — DOCTOR'S OFFICE (OUTPATIENT)
Dept: URBAN - METROPOLITAN AREA CLINIC 125 | Facility: CLINIC | Age: 80
Setting detail: OPHTHALMOLOGY
End: 2025-06-26
Payer: COMMERCIAL

## 2025-06-26 DIAGNOSIS — H52.4: ICD-10-CM

## 2025-06-26 DIAGNOSIS — H52.223: ICD-10-CM

## 2025-06-26 PROBLEM — H16.223: Status: ACTIVE | Noted: 2025-06-26

## 2025-06-26 PROCEDURE — 92012 INTRM OPH EXAM EST PATIENT: CPT | Performed by: OPHTHALMOLOGY

## 2025-06-26 PROCEDURE — 92015 DETERMINE REFRACTIVE STATE: CPT | Performed by: OPHTHALMOLOGY

## 2025-06-26 ASSESSMENT — REFRACTION_MANIFEST
OD_ADD: +2.50
OD_CYLINDER: -2.50
OS_AXIS: 092
OD_SPHERE: +1.75
OS_VA1: 20/20-1
OD_CYLINDER: -1.50
OS_VA1: 20/25-
OD_VA1: 20/20-1
OS_AXIS: 100
OD_AXIS: 088
OS_SPHERE: +1.00
OD_AXIS: 080
OD_ADD: +2.25
OD_VA1: 20/25-
OS_CYLINDER: -2.25
OU_VA: 20/20-2
OD_SPHERE: +0.75
OS_CYLINDER: -2.00
OS_SPHERE: +1.00
OS_ADD: +2.25
OS_ADD: +2.50

## 2025-06-26 ASSESSMENT — CORNEAL DYSTROPHY
OS_DYSTROPHY: ARCUS SENILIS
OD_DYSTROPHY: ARCUS SENILIS

## 2025-06-26 ASSESSMENT — REFRACTION_AUTOREFRACTION
OS_SPHERE: +1.25
OD_CYLINDER: -2.75
OD_AXIS: 075
OS_AXIS: 086
OD_SPHERE: +2.00
OS_CYLINDER: -2.00

## 2025-06-26 ASSESSMENT — REFRACTION_CURRENTRX
OS_OVR_VA: 20/
OD_OVR_VA: 20/

## 2025-06-26 ASSESSMENT — PUNCTA - ASSESSMENT
OD_PUNCTA: RLL SMALL
OS_PUNCTA: LLL SMALL

## 2025-06-26 ASSESSMENT — CORNEAL PTERYGIUM: OS_PTERYGIUM: 1MM

## 2025-06-26 ASSESSMENT — KERATOMETRY
OS_K1POWER_DIOPTERS: 42.75
OD_AXISANGLE_DEGREES: 075
OD_K2POWER_DIOPTERS: 44.25
OD_K1POWER_DIOPTERS: 42.75
OS_AXISANGLE_DEGREES: 102
OS_K2POWER_DIOPTERS: 44.75

## 2025-06-26 ASSESSMENT — LID EXAM ASSESSMENTS: OS_BLEPHARITIS: T

## 2025-06-26 ASSESSMENT — DRY EYES - PHYSICIAN NOTES
OS_GENERALCOMMENTS: +
OD_GENERALCOMMENTS: +

## 2025-06-26 ASSESSMENT — VISUAL ACUITY
OD_BCVA: 20/50-2
OS_BCVA: 20/40-2

## 2025-08-13 ENCOUNTER — DOCTOR'S OFFICE (OUTPATIENT)
Dept: URBAN - METROPOLITAN AREA CLINIC 125 | Facility: CLINIC | Age: 80
Setting detail: OPHTHALMOLOGY
End: 2025-08-13
Payer: MEDICARE

## 2025-08-13 DIAGNOSIS — H16.222: ICD-10-CM

## 2025-08-13 DIAGNOSIS — H35.3122: ICD-10-CM

## 2025-08-13 DIAGNOSIS — H40.052: ICD-10-CM

## 2025-08-13 DIAGNOSIS — H35.3112: ICD-10-CM

## 2025-08-13 DIAGNOSIS — H16.221: ICD-10-CM

## 2025-08-13 PROCEDURE — 83861 MICROFLUID ANALY TEARS: CPT | Mod: QW,RT | Performed by: OPHTHALMOLOGY

## 2025-08-13 PROCEDURE — 92134 CPTRZ OPH DX IMG PST SGM RTA: CPT | Performed by: OPHTHALMOLOGY

## 2025-08-13 PROCEDURE — 99213 OFFICE O/P EST LOW 20 MIN: CPT | Performed by: OPHTHALMOLOGY

## 2025-08-13 PROCEDURE — 83861 MICROFLUID ANALY TEARS: CPT | Mod: QW,LT | Performed by: OPHTHALMOLOGY

## 2025-08-13 ASSESSMENT — KERATOMETRY
OS_K1POWER_DIOPTERS: 42.75
OS_K2POWER_DIOPTERS: 44.75
OD_AXISANGLE_DEGREES: 075
OD_K2POWER_DIOPTERS: 44.25
OD_K1POWER_DIOPTERS: 42.75
OS_AXISANGLE_DEGREES: 102

## 2025-08-13 ASSESSMENT — REFRACTION_CURRENTRX
OD_OVR_VA: 20/
OS_OVR_VA: 20/

## 2025-08-13 ASSESSMENT — REFRACTION_MANIFEST
OD_SPHERE: +0.75
OD_CYLINDER: -2.50
OD_CYLINDER: -1.50
OU_VA: 20/20-2
OS_AXIS: 100
OD_AXIS: 080
OS_VA1: 20/20-1
OS_SPHERE: +1.00
OS_ADD: +2.50
OD_ADD: +2.25
OS_SPHERE: +1.00
OS_CYLINDER: -2.00
OD_VA1: 20/20-1
OS_VA1: 20/25-
OD_ADD: +2.50
OD_SPHERE: +1.75
OS_CYLINDER: -2.25
OD_AXIS: 088
OS_ADD: +2.25
OD_VA1: 20/25-
OS_AXIS: 092

## 2025-08-13 ASSESSMENT — CONFRONTATIONAL VISUAL FIELD TEST (CVF)
OD_FINDINGS: FULL
OS_FINDINGS: FULL

## 2025-08-13 ASSESSMENT — DRY EYES - PHYSICIAN NOTES
OS_GENERALCOMMENTS: +
OD_GENERALCOMMENTS: +

## 2025-08-13 ASSESSMENT — PUNCTA - ASSESSMENT
OS_PUNCTA: LLL SMALL
OD_PUNCTA: RLL SMALL

## 2025-08-13 ASSESSMENT — REFRACTION_AUTOREFRACTION
OD_SPHERE: +2.00
OS_SPHERE: +1.25
OS_AXIS: 086
OD_AXIS: 075
OD_CYLINDER: -2.75
OS_CYLINDER: -2.00

## 2025-08-13 ASSESSMENT — VISUAL ACUITY
OD_BCVA: 20/60+2
OS_BCVA: 20/50-2

## 2025-08-13 ASSESSMENT — CORNEAL DYSTROPHY
OD_DYSTROPHY: ARCUS SENILIS
OS_DYSTROPHY: ARCUS SENILIS

## 2025-08-13 ASSESSMENT — CORNEAL PTERYGIUM: OS_PTERYGIUM: 1MM

## 2025-08-13 ASSESSMENT — LID EXAM ASSESSMENTS: OS_BLEPHARITIS: T
